# Patient Record
Sex: FEMALE
[De-identification: names, ages, dates, MRNs, and addresses within clinical notes are randomized per-mention and may not be internally consistent; named-entity substitution may affect disease eponyms.]

---

## 2021-01-04 ENCOUNTER — HOSPITAL ENCOUNTER (EMERGENCY)
Dept: HOSPITAL 97 - ER | Age: 26
Discharge: HOME | End: 2021-01-04
Payer: COMMERCIAL

## 2021-01-04 VITALS — TEMPERATURE: 99.2 F | DIASTOLIC BLOOD PRESSURE: 91 MMHG | SYSTOLIC BLOOD PRESSURE: 117 MMHG | OXYGEN SATURATION: 99 %
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
## 2021-01-04 DIAGNOSIS — S00.12XA: Primary | ICD-10-CM
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
## 2021-01-04 PROCEDURE — 76377 3D RENDER W/INTRP POSTPROCES: CPT

## 2021-01-04 PROCEDURE — 70486 CT MAXILLOFACIAL W/O DYE: CPT

## 2021-01-04 PROCEDURE — 99283 EMERGENCY DEPT VISIT LOW MDM: CPT
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
## 2021-01-04 NOTE — XMS REPORT
Summary of Care

                           Created on:2020



Patient:Nickie Keenan

Sex:Female

:1995

External Reference #:[id number]





Demographics







                          Address                   [address] [place] [address]

 

                          Mobile Phone              [phone]

 

                          Home Phone                [phone]

 

                          Email Address             [email or web address]

 

                          Preferred Language        English

 

                          Marital Status            Single

 

                          Restorationism Affiliation     Unknown

 

                          Race                      Black or 

 

                          Ethnic Group              Not  or 









Author







                          Organization              [facility]

 

                          Address                   [address] [place] [id number]









Support







                Name            Relationship    Address         Phone

 

                Anisa Espana      Unavailable     Unavailable     [phone]









Care Team Providers







                    Name                Role                Phone

 

                    Suzette Martines MD Primary Care Provider [phone]









Reason for Visit







                          Reason                    Comments

 

                          INJECTION                 depo







Encounter Details







             Date         Type         Department   Care Team    Description

 

                2020      Nurse Visit     [facility] Digna Saldana MD



[address] [place] [address] [address]



[place] [address] [phone] (Fax)                      Encounter for



                                                            Healthcare- [facility]



                                        



Nurse, [facility]               Depo-Provera



                                       [address] [place], [place] [address]t

ion (Primary



                                                            Drive, [address]



                                                    Dx)



                                       [place], [place]              



                                                            [address] [phone]              







Allergies

No Known Allergiesdocumented as of this encounter (statuses as of 2020)



Medications







          Medication Sig       Dispensed Refills   Start     End       Status



                                                  Date      Date      

 

          medroxyPROGESTERone 150 medroxyprogesterone           0               

              Active



          mg/mL injection 150 mg/mL                                         



                    intramuscular                                         



                    suspension                                         

 

          PARoxetine 10 mg Take 1 tablet by mouth 30 tablet 2         20  

          Active



          tabletIndications: daily.                        20                  



          Anxiety and depression                                                

   









          Hospital, Clinic, or Other Ordered Dose Route     Frequency Start Date

 End Date  

Status



          Facility Administered                                                 

  



          Medication                                                   

 

          medroxyPROGESTERone 150 mg    IM        ONCE      2020

0 Ended



          (DEPO-PROVERA) injection                                              

     



          150 mg                                                      



documented as of this encounter (statuses as of 2020)



Active Problems







                          Problem                   Noted Date

 

                          B12 deficiency            2019









                                        Overview: 







                                        B12 level <400, prescribed an oral suppl

ement









                          Tobacco use               10/11/2019

 

                          Anxiety and depression    10/11/2019

 

                          Skin hypopigmentation     10/11/2019

 

                          Abnormal Pap smear of cervix 10/01/2019

 

                          Seasonal allergies        10/01/2019



documented as of this encounter (statuses as of 2020)



Immunizations







                    Name                Administration Dates Next Due

 

                    TDAP                2016          



documented as of this encounter



Social History







             Tobacco Use  Types        Packs/Day    Years Used   Date

 

             Current Some Day Smoker Cigarettes                             









                Smokeless Tobacco: Never Used                                 









                                        Comments: 1 Pack per 2 weeks









                Alcohol Use     Drinks/Week     oz/Week         Comments

 

                Yes                                             ocassionally- on

ce a week









                          Sex Assigned at Birth     Date Recorded

 

                          Not on file               









                    COVID-19 Exposure   Response            Date Recorded

 

                    In the last month, have you been in contact with No / Unsure

         2020 10:01 AM 

CST



                    someone who was confirmed or suspected to have              

       



                    Coronavirus / COVID-19?                     



documented as of this encounter



Last Filed Vital Signs







                Vital Sign      Reading         Time Taken      Comments

 

                Blood Pressure  107/72          2020 10:22 AM CST 

 

                Pulse           80              2020 10:22 AM CST 

 

                Temperature     36.8 C (98.3 F) 2020 10:22 AM CST 

 

                Respiratory Rate 18              2020 10:22 AM CST 

 

                Oxygen Saturation -               -               

 

                Inhaled Oxygen Concentration -               -               

 

                Weight          61.2 kg (135 lb) 2020 10:22 AM CST 

 

                Height          157.5 cm (5' 2") 2020 10:22 AM CST 

 

                Body Mass Index 24.69           2020 10:22 AM CST 



documented in this encounter



Patient Instructions

Patient InstructionsSavannah Sultana RN - 2020 10:00 AM CST

Patient Education



Medroxyprogesterone injection [Contraceptive]

Brand Names: Depo-Provera, Depo-subQ Provera 104

What is this medicine?

MEDROXYPROGESTERONE (me DROX ee proe JUSTIN te megan) contraceptive injections 
prevent pregnancy. They provide effective birth control for 3 months. Depo-subQ 
Provera 104 is also used for treating pain related to endometriosis.

How should I use this medicine?

Depo-Provera Contraceptive injection is given into a muscle. Depo-subQ Provera 
104 injection is given under the skin. These injections are given by a health 
care professional. You must not be pregnant before getting an injection. The 
injection is usually given during the first 5 days after the start of a 
menstrual period or 6 weeks after delivery of a baby.

Talk to your pediatrician regarding the use of this medicine in children. 
Special care may be needed. These injections have been used in female children 
who have started having menstrual periods.

What side effects may I notice from receiving this medicine?

Side effects that you should report to your doctor or health care professional 
as soon as possible:

 allergic reactions like skin rash, itching or hives, swelling of the face, 
lips, or tongue

 breast tenderness or discharge

 breathing problems

 changes in vision

 depression

 feeling faint or lightheaded, falls

 fever

 pain in the abdomen, chest, groin, or leg

 problems with balance, talking, walking

 unusually weak or tired

 yellowing of the eyes or skin

Side effects that usually do not require medical attention (report to your 
doctor or health care professional if they continue or are bothersome):

 acne

 fluid retention and swelling

 headache

 irregular periods, spotting, or absent periods

 temporary pain, itching, or skin reaction at site where injected

 weight gain

What may interact with this medicine?

Do not take this medicine with any of the following medications:

 bosentan

This medicine may also interact with the following medications:

 aminoglutethimide

 antibiotics or medicines for infections, especially rifampin, rifabutin, 
rifapentine, and griseofulvin

 aprepitant

 barbiturate medicines such as phenobarbital or primidone

 bexarotene

 carbamazepine

 medicines for seizures like ethotoin, felbamate, oxcarbazepine, phenytoin, 
topiramate

 modafinil

 White [place]'s wort

What if I miss a dose?

Try not to miss a dose. You must get an injection once every 3 months to 
maintain birth control. If you cannot keep an appointment, call and reschedule 
it. If you wait longer than 13 weeks between Depo-Provera contraceptive 
injections or longer than 14 weeks between Depo-subQ Provera 104 injections, you
 could get pregnant. Use another method for birth control if you miss your 
appointment. You may also need a pregnancy test before receiving another 
injection.

Where should I keep my medicine?

This does not apply. The injection will be given to you by a health care 
professional.

What should I tell my health care provider before I take this medicine?

They need to know if you have any of these conditions:

 frequently drink alcohol

 asthma

 blood vessel disease or a history of a blood clot in the lungs or legs

 bone disease such as osteoporosis

 breast cancer

 diabetes

 eating disorder (anorexia nervosa or bulimia)

 high blood pressure

 HIV infection or AIDS

 kidney disease

 liver disease

 mental depression

 migraine

 seizures (convulsions)

 stroke

 tobacco smoker

 vaginal bleeding

 an unusual or allergic reaction to medroxyprogesterone, other hormones, 
medicines, foods, dyes, or preservatives

 pregnant or trying to get pregnant

 breast-feeding

What should I watch for while using this medicine?

This drug does not protect you against HIV infection (AIDS) or other sexually 
transmitted diseases.

Use of this product may cause you to lose calcium from your bones. Loss of 
calcium may cause weak bones (osteoporosis). Only use this product for more than
 2 years if other forms of birth control are not right for you. The longer you 
use this product for birth control the more likely you will be at risk for weak 
bones. Ask your health care professional how you can keep strong bones.

You may have a change in bleeding pattern or irregular periods. Many females 
stop having periods while taking this drug.

If you have received your injections on time, your chance of being pregnant is 
very low. If you think you may be pregnant, see your health care professional as
 soon as possible.

Tell your health care professional if you want to get pregnant within the next 
year. The effect of this medicine may last a long time after you get your last 
injection.

NOTE:This sheet is a summary. It may not cover all possible information. If you 
have questions aboutthis medicine, talk to your doctor, pharmacist, or health 
care provider. Copyright 2018 [employer]





Electronically signed by Savannah Sultana RN at 2020 10:14 AM CST

documented in this encounter



Progress Notes

Savannah Sultana RN - 2020 10:00 AM CST25 year old female has been 
identified by  and name.  Verbal consent has been obtained by patientto have 
an injection of Depo Provera, as ordered by the provider.



Date of last Depo Provera injection: 2020



Last Pap Smear: 2019



Encounter Diagnosis:  v25.49



The site was cleaned with an alcohol swab and given intramuscularly (IM) in the 
left gluteus.  A band aid dressing was then applied to the injection site.  The 
patient tolerated the procedure well .

Electronically signed by Savannah Sultana RN at 2020 10:30 AM CST
documented in this encounter



Plan of Treatment







             Date         Type         Specialty    Care Team    Description

 

                2020      Office Visit    Obstetrics & Gynecology Rachael Arana PA-C



                                        



                                                                [address] [place] [address]

 [phone]



                                        



                                                    [phone] (Fax) 

 

             2021   Nurse Visit  Obstetrics & Gynecology Nurse, [facility] 









                Health Maintenance Due Date        Last Done       Comments

 

                PNEUMOCOCCAL 0-64 YEARS COMBINED SERIES (1 of 1 - 1999    

                  



                PPSV23)                                         

 

                Depression Screening 2005                      

 

                INFLUENZA VACCINE (#1) 2020                      

 

                PAP SMEAR       2022      

 

                DTaP,Tdap,and Td Vaccines (2 - Td) 2026   

   



documented as of this encounter



Results

Not on filedocumented in this encounter



Visit Diagnoses







                                        Diagnosis

 

                                        Encounter for Depo-Provera contraception

 - Primary







                                        Surveillance of other previously prescri

bed contraceptive method



documented in this encounter



Administered Medications







           Medication Order MAR Action Action Date Dose       Rate       Site

 

           medroxyPROGESTERone Given      2020 10:28 150 mg               

 Left



           (DEPO-PROVERA) injection 150            AM CST                       

    Dorsogluteal-IM



                                        mg



                                                                 



           150 mg, Intramuscular, ONCE, 1                                       

      



           dose, Mon 20 at 1130,                                           

  



           Routine                                                









                                                    



documented in this encounter



Insurance







          Payer     Benefit Plan / Subscriber ID Effective Phone     Address   T

[facility] [id number] 2018-Prese           [address]

aid



           HEALTH CHOICE - HEALTH CHOICE            [id number]                    [id number]

1



                                        



          MANAGED   MEDICAID                                [place], [place] 



          MEDICAID                                          [id number] 









           Guarantor Name Account Type Relation to Date of    Phone      Billing



                                 Patient    Birth                 Address

 

           Nickie Keenan Personal/Family Self       1995 [phone] [address]



                                                       (Home)     [place], [place]



                                                                  [id number]



documented as of this encounter

## 2021-01-04 NOTE — XMS REPORT
Continuity of Care Document

                           Created on:2021



Patient:JESSE NOWAK

Sex:Female

:1995

External Reference #:[id number]





Demographics







                          Address                   [address] [place] [address]

 

                          Home Phone                [phone]

 

                          Mobile Phone              [phone]

 

                          Email Address             [address]

 

                          Preferred Language        English

 

                          Marital Status            Unknown

 

                          Muslim Affiliation     000

 

                          Race                      Unknown

 

                          Additional Race(s)        Unavailable



                                                    Black or 

 

                          Ethnic Group              Not  or 









Author







                          Organization              [facility]

t

 

                          Address                   [address]



                                                    [place] [address]

 

                          Phone                     [phone]









Support







                Name            Relationship    Address         Phone

 

                Malorie            Mother          Unavailable     [phone]









Care Team Providers







                    Name                Role                Phone

 

                    Yasmeen BURLESON       Attending Clinician [phone]









Problems

This patient has no known problems.



Allergies, Adverse Reactions, Alerts

This patient has no known allergies or adverse reactions.



Medications

This patient has no known medications.



Procedures

This patient has no known procedures.



Encounters







        Start   End     Encounter Admission Attending Care    Care    Encounter 

Source



        Date/Time Date/Time Type    Type    Clinicians Facility Department ID   

   

 

        2020 Case            [facility]    [phone] [id number]

[id number] 



        00:00:00 00:00:00 Management         Rachael Abdi [id number]       

  



                                                [place] [id number]         



                                                [address] [phone]         



                                                [address]     [address]             



                                                Building                 

 

        2020 Telephone         [facility]    [phone] [id number]

[id number] 



        00:00:00 00:00:00                 Rachael Abdi [id number]         



                                                [place] [id number]         



                                                [place] [phone]         



                                                [facility]     [address]             



                                                [facility]                 







Results

This patient has no known results.

## 2021-01-04 NOTE — RAD REPORT
EXAM DESCRIPTION:  CT - Facial Bones W/ Mpr - 1/4/2021 12:53 pm

 

CLINICAL HISTORY:  Facial injury status post trauma. Facial pain

 

TECHNIQUE:  Computed axial tomography of the face was obtained. Coronal and sagittal reconstruction w
as performed.

 

All CT scans are performed using dose optimization technique as appropriate and may include automated
 exposure control or mA/KV adjustment according to patient size.

 

FINDINGS:  Left preseptal swelling.

 

 A fracture is not seen. Deviation of nasal septum towards the left

 

A TMJ dislocation is not noted.

 

The globes are intact. Fluid within the sinuses is not seen. Mucus retention cyst right maxillary sin
us

 

IMPRESSION:   Negative for a facial fracture.

## 2021-01-04 NOTE — XMS REPORT
Summary of Care

                          Created on:2020



Patient:Nickie Keenan

Sex:Female

:1995

External Reference #:[id number]





Demographics







                          Address                   [address] [place] [address]

 

                          Mobile Phone              [phone]

 

                          Home Phone                [phone]

 

                          Email Address             [email or web address]

 

                          Preferred Language        English

 

                          Marital Status            Single

 

                          Zoroastrian Affiliation     Unknown

 

                          Race                      Black or 

 

                          Ethnic Group              Not  or 









Author







                          Organization              [facility]

 

                          Address                   [address] [place] [id number]









Support







                Name            Relationship    Address         Phone

 

                Anisa Espana      Unavailable     Unavailable     [phone]









Care Team Providers







                    Name                Role                Phone

 

                    Suzette Martines MD Primary Care Provider [phone]









Reason for Visit







                          Reason                    Comments

 

                          Well Woman Exam           







Encounter Details







             Date         Type         Department   Care Team    Description

 

             2020   Office Visit [facility] Rachael Arana, Well

 woman exam 

with routine gynecological exam (Primary Dx);



                                                            [facility]



                                        Screening breast examination;



                                       [address] [place] [address] Depo-Pr

overa contraceptive status;



                                                            [address], [address]



                                        Drive



                                        Need for HPV vaccine;



                                                [place], [place]    [address]



                                        Papanicolaou smear of cervix with low gr

primo squamous intraepithelial lesion 

(LGSIL)



                                                            [id number]



                          [place]              



                                                [phone] [id number] [phone] 



                                                    (Fax)        







Allergies

No Known Allergiesdocumented as of this encounter (statuses as of 2020)



Medications







          Medication Sig       Dispensed Refills   Start     End       Status



                                                  Date      Date      

 

          medroxyPROGESTERone 150 medroxyprogesterone           0               

              Active



          mg/mL injection 150 mg/mL                                         



                    intramuscular                                         



                    suspension                                         

 

          PARoxetine 10 mg Take 1 tablet by mouth 30 tablet 2         20  

          Active



          tabletIndications: daily.                        20                  



          Anxiety and depression                                                

   



documented as of this encounter (statuses as of 2020)



Active Problems







                          Problem                   Noted Date

 

                          B12 deficiency            2019









                                        Overview: 







                                        B12 level <400, prescribed an oral suppl

ement









                          Tobacco use               10/11/2019

 

                          Anxiety and depression    10/11/2019

 

                          Skin hypopigmentation     10/11/2019

 

                          Abnormal Pap smear of cervix 10/01/2019

 

                          Seasonal allergies        10/01/2019



documented as of this encounter (statuses as of 2020)



Immunizations







                    Name                Administration Dates Next Due

 

                    HPV9                2020

 

                    TDAP                2016          



documented as of this encounter



Social History







             Tobacco Use  Types        Packs/Day    Years Used   Date

 

             Current Some Day Smoker Cigarettes                             









                Smokeless Tobacco: Never Used                                 









                                        Tobacco Cessation: Ready to Quit: No; Co

unseling Given: Yes



                                        Comments: 1 Pack per 2 weeks









                Alcohol Use     Drinks/Week     oz/Week         Comments

 

                Yes                                             ocassionally- on

ce a week









                          Sex Assigned at Birth     Date Recorded

 

                          Not on file               









                    COVID-19 Exposure   Response            Date Recorded

 

                    In the last month, have you been in contact with No / Unsure

         2020  7:56 AM

CST



                    someone who was confirmed or suspected to have              

       



                    Coronavirus / COVID-19?                     



documented as of this encounter



Last Filed Vital Signs







                Vital Sign      Reading         Time Taken      Comments

 

                Blood Pressure  112/78          2020  8:10 AM 



                                                CST             

 

                Pulse           78              2020  8:10 AM 



                                                CST             

 

                Temperature     36.8 C (98.2 F) 2020  8:10 AM 



                                                CST             

 

                Respiratory Rate 18              2020  8:10 AM 



                                                CST             

 

                Oxygen Saturation -               -               

 

                Inhaled Oxygen Concentration -               -               

 

                Weight          62.1 kg (136 lb 12.8 oz) 2020  8:10 AM 



                                                CST             

 

                Height          157.5 cm (5' 2") 2020  8:10 AM 



                                                CST             

 

                Body Mass Index 25.02           2020  8:10 AM 



                                                CST             



documented in this encounter



Progress Notes

Savannah Sultana RN - 2020  8:00 AM CST25 year old female has been 
identified by  and name.  Verbal consent has been obtained by patientto have 
an injection, as ordered by the provider.



NDC (National Drug Code) 7552-3314-63.

Patient or state-supplied medications?: no



Has ABN (Advanced Beneficiary Notice) been completed?  No



Previous/Current Encounter Diagnosis:  Need for HPV vaccine



The site was cleaned with an alcohol swab and given intramuscularly (IM).  A 
band aid dressing was then applied to the injection site.  The patient tolerated
the procedure well and was observed for 20 minutes after the injection for 
possible reaction.



Patient provided with preferred teaching of verbal information on Anaphylaxis. 
Shows readiness to learn. Verbal/Written instruction teaching provided. 
Individual is able to read and verbalizes understanding of teaching provided. 
Signs and Symptoms of Anaphylaxis (severe allergic reaction) are: Tingling, 
itching or metallic taste in mouth; hives; difficulty breathing; swelling and/or
itching of mouth and/or throat; diarrhea, vomiting, cramps and stomach pain; 
paleness; loss of consciousness. IF YOU HAVE ANY OF THE SYMPTOMS ABOVE, ACT 
FAST!!! CALL 911 IMMEDIATELY IF YOU HAVE A PRESCRIBED EPI-PEN PLEASE USE IT NOW.

Electronically signed by Savannah Sultana RN at 2020  9:02 AM Rachael Castillo PA-C - 2020  8:00 AM CSTFormatting of this note might be 
different from the original.

Chief complaint:

Chief Complaint

Patient presents with

 Well Woman Exam



HPI

Nickie Keenan is a 25 year old female  presenting for well woman exam.

She is particularly concerned about WWE

The patient has a Body mass index is 25.02 kg/m..  She is working on eating 
healthier and exercising more.



The patient is  not concerned about her menstrual cycles.  Her cycles are not 
present due to depo.

The patient is sexually active.  She currently has 1 sexual partner(s).  She is 
offered sexually transmitted disease testing and declines.

She has had 1 sexual partners in the past year.  She engages in vaginal and oral
sex.  She prefers men.

She is currently using depo for contraception.

The patient denies any urinary incontinence.  She denies any fecal incontinence.



Her last pap smear was in 2019 and was abnormal. LGSIL. Patient reports has had 
a hx of abnormal papsmears before. Patient reports she has seen Dr. Ferrell and 
had a colpo. Will request medical records.  Her next pap smear is due today..

She engages in breast self awareness.  She denies any breast changes.



She denies any family history of breast, ovarian, uterine cancer.

Patient reports has paternal grandfather that had colon cancer.



She has not had a flu shot this year.  Patient declines.

The patient feels safe at home.



She denies any history of drug use.  She does smoke.  She  drinks socially.



Her mood is good.



Histories

OB History

 Para Term  AB Living

1 1 1     1

SAB TAB Ectopic Multiple Live Births

        1



# Outcome Date GA Lbr Rj/2nd Weight Sex Delivery Anes PTL Lv

1 Term 16   6 lb (2.722 kg)  Vag-Spont   JOSE ARMANDO



Past Medical History:

Diagnosis Date

 Abnormal Pap smear of cervix 10/1/2019

 Anxiety and depression 10/11/2019

 B12 deficiency 2019

 B12 level &lt;400, prescribed an oral supplement

 Seasonal allergies 10/1/2019

 Skin hypopigmentation 10/11/2019



Family History

Problem Relation Age of Onset

 Anxiety Mother

 Hypertension Father

 Diabetes Father

 Depression Father

 Anxiety Father

 Asthma Father

 CHF (congestive heart failure) Father

 Pancreatic Cancer Maternal Grandmother

 Cancer Maternal Grandfather

     throat cancer

 Colon Cancer Paternal Grandfather



Family Status

Relation Name Status

 Mo  Alive

 Fa  Alive

 MGMo  (Not Specified)

 MGFa  (Not Specified)

 PGFa  (Not Specified)



Past Surgical History:

Procedure Laterality Date

 COLPOSCOPY



Social History



Socioeconomic History

 Marital status: Single

  Spouse name: Not on file

 Number of children: Not on file

 Years of education: Not on file

 Highest education level: Not on file

Occupational History

 Not on file

Social Needs

 Financial resource strain: Not on file

 Food insecurity

  Worry: Not on file

  Inability: Not on file

 Transportation needs

  Medical: Not on file

  Non-medical: Not on file

Tobacco Use

 Smoking status: Current Some Day Smoker

  Types: Cigarettes

 Smokeless tobacco: Never Used

 Tobacco comment: 1 Pack per 2 weeks

Substance and Sexual Activity

 Alcohol use: Yes

  Comment: ocassionally- once a week

 Drug use: Yes

  Types: Marijuana

 Sexual activity: Yes

  Partners: Male

  Birth control/protection: Injection

Lifestyle

 Physical activity

  Days per week: Not on file

  Minutes per session: Not on file

 Stress: Not on file

Relationships

 Social connections

  Talks on phone: Not on file

  Gets together: Not on file

  Attends Oriental orthodox service: Not on file

  Active member of club or organization: Not on file

  Attends meetings of clubs or organizations: Not on file

  Relationship status: Not on file

 Intimate partner violence

  Fear of current or ex partner: Not on file

  Emotionally abused: Not on file

  Physically abused: Not on file

  Forced sexual activity: Not on file

Other Topics Concern

 Not on file

Social History Narrative

 Denies physical and sexual abuse.



Social History



Substance and Sexual Activity

Sexual Activity Yes

 Partners: Male

 Birth control/protection: Injection







Labs

none



Radiology

none



Allergies

Nickie has No Known Allergies.



Medications

Nickie has a current medication list which includes the following 
prescription(s): paroxetine and medroxyprogesterone.



Review of Systems

Constitutional: Negative for appetite change, fatigue, fever, unexpected weight 
change, weight gain and weight loss.

HENT: Negative for rhinorrhea and sore throat.

Eyes: Negative for pain and itching.

Respiratory: Negative for cough, chest tightness and shortness of breath.

Breasts: Negative for discharge, mass and pain.

Cardiovascular: Negative for chest pain, palpitations and leg swelling.

Gastrointestinal: Negative for abdominal pain, constipation, diarrhea and 
nausea.

Genitourinary: Negative for bladder incontinence, dysuria, vaginal discharge, 
difficulty urinating, vaginal pain and pelvic pain.

Musculoskeletal: Negative for gait problem and myalgias.

Skin: Negative for rash.

Neurological: Negative for dizziness and headaches.

Psychiatric/Behavioral: Negative for suicidal ideas. The patient is not 
nervous/anxious.

Endocrine: Negative for hair loss, weight gain and weight loss.



/78 (BP Location: Left arm, Patient Position: Sitting, BP CUFF SIZE: Adult
Medium)  | Pulse 78 | Temp 36.8 C (98.2 F) (Oral)  | Resp 18  | Ht 5' 2" 
(1.575 m)  | Wt 136 lb 12.8 oz (62.1 kg)  | LMP  (LMP Unknown)  | BMI 25.02 
kg/m

Pregravid BMI: Could not be calculated



Physical Exam

Vitals reviewed.

Constitutional: She is oriented to person, place, and time. She appears well-
developed and well-nourished.

Neck: No mass. No thyromegaly palpated. No neck adenopathy.

Cardiovascular: Regular rate and rhythm.

Pulmonary/Chest: Normal inspiratory effort.

Abdominal: Abdomen is soft. No tenderness present. No hernia palpated or 
inspected.

Neuro/Psychiatric: She has a normal mood and affect. She is oriented to person, 
place, and time.

Skin: Skin normal.

Lymphadenopathy: No neck adenopathy present. No axillary adenopathy present. No 
inguinal adenopathy present.



Breast: Right breast exhibits no mass, no nipple discharge and no tenderness. 
Left breast exhibits no mass, no nipple discharge and no tenderness. Breasts are
symmetrical. Normal left breast and normalright breast

External genitalia: Normal external genitalia appropriate for age.

Urethral meatus: Normal urethral meatus

Urethra: Normal urethra.

Bladder: No tenderness.  Normal bladder

Vagina:Normal vagina. No lesion inspected. No abnormal vaginal discharge found. 
  No lesions in thevagina.



Cervix: Normal cervix. No lesion. No tenderness and no discharge present.

Uterus: Uterus is non-tender. Normal uterus

Adnexa: Right adnexa without tenderness. Left adnexa without tenderness. Normal 
left adnexa and normal right adnexa

Anus/perineum: Normal perineum and normal anus.







Assessment/Plan

Well woman exam with routine gynecological exam  (primary encounter diagnosis)

FOLLOW-UP in 1 yr WWE



HPV vaccine

-series began today. Patient education given.



On depo

For Depo-provera, it is given every 3 months.  May cause heavy and irregular 
bleeding initially.  Side effects include but not limited to headache, weight 
gain, mood lability, breast tenderness, and abnormal uterine bleeding.  
Discussed need for calcium and weightbearing exercise and stressed importance of
continued use of condoms for STD protection. It may take up to a year after 
discontinuation of medication to regain ovulation. Discussed risks and benefits 
of medication and questions answered.



Screening breast examination

No complaints, self awareness.



Return to clinic in 1 yr WWE

Discussed treatment options.

Reviewed patient instructions and provided printed copy.



This visit did not involve counseling and coordination that comprised more than 
50% of the visit time.



Rachael Arana PA-C  2020  8:13 AM



Electronically signed by Rachael Arana PA-C at 2020  9:18 AM CST
documented in this encounter



Plan of Treatment







             Date         Type         Specialty    Care Team    Description

 

             2021   Nurse Visit  Obstetrics & Gynecology Nurse, [facility]

s [facility] 

 

             2021   Nurse Visit  Obstetrics & Gynecology Nurse, [facility] 

 

                2021      Office Visit    Obstetrics & Gynecology Rachael Arana PA-C



                                        



                                                                [address] [place] [address]

 [phone] (Fax) 









                Health Maintenance Due Date        Last Done       Comments

 

                PNEUMOCOCCAL 0-64 YEARS COMBINED SERIES ( of  - 1999    

                  



                PPSV23)                                         

 

                Depression Screening 2005                      

 

                INFLUENZA VACCINE (#1) 2020                      

 

                PAP SMEAR       2022      

 

                DTaP,Tdap,and Td Vaccines (2 - Td) 2026   

   



documented as of this encounter



Procedures







             Procedure Name Priority     Date/Time    Associated Diagnosis Comme

nts

 

             GARDASIL 9 (HPV 9V) Routine      2020  8:47 AM CST Need for H

PV vaccine 



             VACCINE                                             



documented in this encounter



Results

Not on filedocumented in this encounter



Visit Diagnoses







                                        Diagnosis

 

                                        Well woman exam with routine gynecologic

al exam - Primary







                                        Routine gynecological examination

 

                                        Screening breast examination







                                        Other screening breast examination

 

                                        Depo-Provera contraceptive status







                                        Surveillance of other previously prescri

bed contraceptive method

 

                                        Need for HPV vaccine







                                        Need for prophylactic vaccination and in

oculation against other viral diseases

 

                                        Papanicolaou smear of cervix with low gr

primo squamous intraepithelial lesion 

(LGSIL)



documented in this encounter



Insurance







          Payer     Benefit Plan / Subscriber ID Effective Phone     Address   T

ype



                    Group               Dates                         

 

          [facility] [id number] 2018-Jamia           [address]

aid



           [place] - [place]                    [id number]

1



                                        



          MANAGED   MEDICAID                                [place], [place]                                          [id number] 









           Guarantor Name Account Type Relation to Date of    Phone      Billing



                                 Patient    Birth                 Address

 

           Nickie Keenan Personal/Family Self       1995 [phone]



                                                       (Home)     [place], [place]



                                                                  [id number]



documented as of this encounter

## 2021-01-04 NOTE — XMS REPORT
Summary of Care

                          Created on:2020



Patient:Nickie Keenan

Sex:Female

:1995

External Reference #:[id number]





Demographics







                          Address                   [address] [place] [address]

 

                          Mobile Phone              [phone]

 

                          Home Phone                [phone]

 

                          Email Address             [email or web address]

 

                          Preferred Language        English

 

                          Marital Status            Single

 

                          Catholic Affiliation     Unknown

 

                          Race                      Black or 

 

                          Ethnic Group              Not  or 









Author







                          Organization              [facility]

 

                          Address                   [address] [place] [id number]









Support







                Name            Relationship    Address         Phone

 

                Anisa Espana      Unavailable     Unavailable     [phone]









Care Team Providers







                    Name                Role                Phone

 

                    Suzette Martines MD Primary Care Provider [phone]









Reason for Visit







                          Reason                    Comments

 

                          Medical Records           







Encounter Details







             Date         Type         Department   Care Team    Description

 

             2020   Case Management [facility] Rachael Arana M

[facility]



                                                            [facility]- [address], [address]



                                        [place] [address]



                                        



                                                            [address]



                          [place]              



                                                [phone]    [address]



                                        



                                                                [phone] (Fax) 







Allergies

No Known Allergiesdocumented as of this encounter (statuses as of 2020)



Medications







          Medication Sig       Dispensed Refills   Start     End       Status



                                                  Date      Date      

 

          medroxyPROGESTERone 150 medroxyprogesterone           0               

              Active



          mg/mL injection 150 mg/mL                                         



                    intramuscular                                         



                    suspension                                         

 

          PARoxetine 10 mg Take 1 tablet by mouth 30 tablet 2         20  

          Active



          tabletIndications: daily.                        20                  



          Anxiety and depression                                                

   



documented as of this encounter (statuses as of 2020)



Active Problems







                          Problem                   Noted Date

 

                          B12 deficiency            2019









                                        Overview: 







                                        B12 level <400, prescribed an oral suppl

ement









                          Tobacco use               10/11/2019

 

                          Anxiety and depression    10/11/2019

 

                          Skin hypopigmentation     10/11/2019

 

                          Abnormal Pap smear of cervix 10/01/2019

 

                          Seasonal allergies        10/01/2019



documented as of this encounter (statuses as of 2020)



Immunizations







                    Name                Administration Dates Next Due

 

                    HPV9                2020

 

                    TDAP                2016          



documented as of this encounter



Social History







             Tobacco Use  Types        Packs/Day    Years Used   Date

 

             Current Some Day Smoker Cigarettes                             









                Smokeless Tobacco: Never Used                                 









                                        Comments: 1 Pack per 2 weeks









                Alcohol Use     Drinks/Week     oz/Week         Comments

 

                Yes                                             ocassionally- on

ce a week









                          Sex Assigned at Birth     Date Recorded

 

                          Not on file               



documented as of this encounter



Last Filed Vital Signs

Not on filedocumented in this encounter



Progress Notes

Rachael Arana PA-C - 2020  4:10 PM CSTMR received from [facility]



PAP done on 2019

LGSIL



PAP done on 2018

ASCUS

HR HPV + non 16/18



Endocervical Biopsy done on 2018

Dx: scant squamous metaplastic cells with atypia. Fragment with atypical 
squamous metaplastic cells is absent in the deeper levels used for 
immunohistochemical staining for p16 and k167 antibodies wereprocessed as a 
double stain.  The endocervical biopsy contains a fragment of squamous 
metaplastic cells with irregular , hyperchromatic nuclei. These are suspicious 
for, but not diagnostic of dysplasia.



Pap done on 2017

NILM



GC/CT done on 3/22/18 NEG



Pap done on 3/21/2018

ASCUS

HR HPV done on 3/21/2018 + non 16/18



GC/CT done on 3/29/17 NEG



Surgical path report on 2016

Vaginal delivery, fever, chorioamnionitis

-placenta, increased microcalcification, focal, increased cord twisting, mild.



1 hr gtt done on 2016 WNL

HIV done on 2016- NEG



CBC done on 2016- wnl



Sequential Screen Part 1- wnl



HCV done on 2015- neg



Pap smear done on 2015

NILMElectronically signed by Rachael Arana PA-C at 2020  4:21 PM CST
documented in this encounter



Plan of Treatment







             Date         Type         Specialty    Care Team    Description

 

                2021      Office Visit    Obstetrics & Gynecology Kyle Saldana MD



                                        



                                                                [address] [place] [address] [address]. [address]



                                        



                                                                [place] [phone]

15



                                        



                                                                [phone] (Fax) 

 

             2021   Nurse Visit  Obstetrics & Gynecology Nurse, [facility]

s [facility] 

 

             2021   Nurse Visit  Obstetrics & Gynecology Nurse, [facility]

s [facility] 

 

                2021      Office Visit    Obstetrics & Gynecology Rachael Arana PA-C



                                        



                                                                [address] [place] [address]

 [phone] (Fax) 









                Health Maintenance Due Date        Last Done       Comments

 

                PNEUMOCOCCAL 0-64 YEARS COMBINED SERIES (1 of  - 1999    

                  



                PPSV23)                                         

 

                Depression Screening 2005                      

 

                INFLUENZA VACCINE (#1) 2020                      

 

                PAP SMEAR       2022      

 

                DTaP,Tdap,and Td Vaccines (2 - Td) 2026   

   



documented as of this encounter



Results

Not on filedocumented in this encounter



Insurance







          Payer     Benefit Plan / Subscriber ID Effective Phone     Address   [facility] [id number] 2018-Jamia           [address]  [employer]

aid



           HEALTH CHOICE - HEALTH CHOICE            nt                    [id number]

1



                                        



          [facility]   MEDICAID                                [place], [place] MEDICA[place]                                          [id number] 



documented as of this encounter
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
## 2021-01-04 NOTE — XMS REPORT
Summary of Care

                          Created on:2020



Patient:Nickie Keenan

Sex:Female

:1995

External Reference #:[id number]





Demographics







                          Address                   [address] [place] [address]

 

                          Mobile Phone              [phone]

 

                          Home Phone                [phone]

 

                          Email Address             [email or web address]

 

                          Preferred Language        English

 

                          Marital Status            Single

 

                          Yazdanism Affiliation     Unknown

 

                          Race                      Black or 

 

                          Ethnic Group              Not  or 









Author







                          Organization              [facility]

 

                          Address                   [address] [place] [address]









Support







                Name            Relationship    Address         Phone

 

                Anisa Espana      Unavailable     Unavailable     [phone]









Care Team Providers







                    Name                Role                Phone

 

                    Suzette Martines MD Primary Care Provider [phone]









Encounter Details







             Date         Type         Department   Care Team    Description

 

                    2020          Orders Only         [facility]



                          Doctor Unassigned, No     



                                                            [address] [facility]



                                        Name



                                        



                                                [place] [address] [address] [place] [address] 







Allergies

No Known Allergiesdocumented as of this encounter (statuses as of 2020)



Medications







          Medication Sig       Dispensed Refills   Start     End       Status



                                                  Date      Date      

 

          medroxyPROGESTERone 150 medroxyprogesterone           0               

              Active



          mg/mL injection 150 mg/mL                                         



                    intramuscular                                         



                    suspension                                         

 

          PARoxetine 10 mg Take 1 tablet by mouth 30 tablet 2         20  

          Active



          tabletIndications: daily.                        20                  



          Anxiety and depression                                                

   



documented as of this encounter (statuses as of 2020)



Active Problems







                          Problem                   Noted Date

 

                          B12 deficiency            2019









                                        Overview: 







                                        B12 level <400, prescribed an oral suppl

ement









                          Tobacco use               10/11/2019

 

                          Anxiety and depression    10/11/2019

 

                          Skin hypopigmentation     10/11/2019

 

                          Abnormal Pap smear of cervix 10/01/2019

 

                          Seasonal allergies        10/01/2019



documented as of this encounter (statuses as of 2020)



Immunizations







                    Name                Administration Dates Next Due

 

                    HPV9                2020

 

                    TDAP                2016          



documented as of this encounter



Social History







             Tobacco Use  Types        Packs/Day    Years Used   Date

 

             Current Some Day Smoker Cigarettes                             









                Smokeless Tobacco: Never Used                                 









                                        Comments: 1 Pack per 2 weeks









                Alcohol Use     Drinks/Week     oz/Week         Comments

 

                Yes                                             ocassionally- on

ce a week









                          Sex Assigned at Birth     Date Recorded

 

                          Not on file               









                    COVID-19 Exposure   Response            Date Recorded

 

                    In the last month, have you been in contact with No / Unsure

         2020  7:56 AM

CST



                    someone who was confirmed or suspected to have              

       



                    Coronavirus / COVID-19?                     



documented as of this encounter



Last Filed Vital Signs

Not on filedocumented in this encounter



Plan of Treatment







             Date         Type         Specialty    Care Team    Description

 

             2021   Nurse Visit  Obstetrics & Gynecology Nurse, [facility]

[facility] 

 

             2021   Nurse Visit  Obstetrics & Gynecology Nurse, [facility]

s [facility] 

 

                2021      Office Visit    Obstetrics & Gynecology Rachael Arana PA-C



                                        



                                                                [address] [place] [address]

 [phone] (Fax) 









                Health Maintenance Due Date        Last Done       Comments

 

                PNEUMOCOCCAL 0-64 YEARS COMBINED SERIES (1 of  - 1999    

                  



                PPSV23)                                         

 

                Depression Screening 2005                      

 

                INFLUENZA VACCINE (#1) 2020                      

 

                PAP SMEAR       2022      

 

                DTaP,Tdap,and Td Vaccines (2 - Td) 2026   

   



documented as of this encounter



Procedures







             Procedure Name Priority     Date/Time    Associated Diagnosis Comme

nts

 

             AUTHORIZATION TO RELEASE Routine      2020 12:01 AM          

    



             PHI TO [facility]               CST                       



documented in this encounter



Results

Not on filedocumented in this encounter



Insurance







          Payer     Benefit Plan / Subscriber ID Effective Phone     Address   T

[facility] [id number] 2018-[employer]           [address]

aid



           HEALTH CHOICE - HEALTH CHOICE            nt                    [id number]

1



                                        



          MANAGED   MEDICAID                                [place], [place] MEDICA[place]                                          [id number] 



documented as of this encounter

## 2021-01-04 NOTE — XMS REPORT
Summary of Care

                          Created on:2020



Patient:Nickie Keenan

Sex:Female

:1995

External Reference #:[id number]





Demographics







                          Address                   [address] [place] [address]

 

                          Mobile Phone              [phone]

 

                          Home Phone                [phone]

 

                          Email Address             [email or web address]

 

                          Preferred Language        English

 

                          Marital Status            Single

 

                          Confucianism Affiliation     Unknown

 

                          Race                      Black or 

 

                          Ethnic Group              Not  or 









Author







                          Organization              [facility]

 

                          Address                   [address] [place] [address]









Support







                Name            Relationship    Address         Phone

 

                Anisa Espana      Unavailable     Unavailable     [phone]









Care Team Providers







                    Name                Role                Phone

 

                    ConradAbemartinez MARTINS MD Primary Care Provider [phone]









Reason for Visit







                          Reason                    Comments

 

                          TEST RESULTS              MDL Pap ASCUS, cannot exclud

e HGSIL; HR HPV Positive #52







Encounter Details







             Date         Type         Department   Care Team    Description

 

             2020   Telephone    [facility] Rachael Arana, TEST

 RESULTS (MDLURDES Pap



                                                            [facility]



                                        ASCUS, cannot exclude



                                       [address] [facility]; 

HR HPV Positive



                                                            Drive, [address]



                                        Drive



                                        [address])



                                                [facility] [address]



                                        



                                                            [address]



                          [place], [place]              



                                                [phone] [address] [phone] (Fax) 







Allergies

No Known Allergiesdocumented as of this encounter (statuses as of 2020)



Medications







          Medication Sig       Dispensed Refills   Start     End       Status



                                                  Date      Date      

 

          medroxyPROGESTERone 150 medroxyprogesterone           0               

              Active



          mg/mL injection 150 mg/mL                                         



                    intramuscular                                         



                    suspension                                         

 

          PARoxetine 10 mg Take 1 tablet by mouth 30 tablet 2         20  

          Active



          tabletIndications: daily.                        20                  



          Anxiety and depression                                                

   



documented as of this encounter (statuses as of 2020)



Active Problems







                          Problem                   Noted Date

 

                          B12 deficiency            2019









                                        Overview: 







                                        B12 level <400, prescribed an oral suppl

ement









                          Tobacco use               10/11/2019

 

                          Anxiety and depression    10/11/2019

 

                          Skin hypopigmentation     10/11/2019

 

                          Abnormal Pap smear of cervix 10/01/2019

 

                          Seasonal allergies        10/01/2019



documented as of this encounter (statuses as of 2020)



Immunizations







                    Name                Administration Dates Next Due

 

                    HPV9                2020

 

                    TDAP                2016          



documented as of this encounter



Social History







             Tobacco Use  Types        Packs/Day    Years Used   Date

 

             Current Some Day Smoker Cigarettes                             









                Smokeless Tobacco: Never Used                                 









                                        Comments: 1 Pack per 2 weeks









                Alcohol Use     Drinks/Week     oz/Week         Comments

 

                Yes                                             ocassionally- on

ce a week









                          Sex Assigned at Birth     Date Recorded

 

                          Not on file               









                    COVID-19 Exposure   Response            Date Recorded

 

                    In the last month, have you been in contact with No / Unsure

         2020  7:56 AM

CST



                    someone who was confirmed or suspected to have              

       



                    Coronavirus / COVID-19?                     



documented as of this encounter



Last Filed Vital Signs

Not on filedocumented in this encounter



Miscellaneous Notes

Telephone Encounter - Rachael Arana PA-C - 2020 10:13 AM CSTPatient 
will need colpo with Dr. Saldana.



Please inform patient.



Thank you,



Rachael rAana PA-C



Electronically signed by Rachael Arana PA-C at 2020 10:14 AM CST
Telephone Encounter - Eliezer Abdul MA - 2020  8:30 AM CSTMDL Pap Results:



Pap: ASCUS , cannot exclude HGSIL



HR HPV: Positive for #52



Forwarded to Rachael for review.

Will place original on desk for signature and review and f/u as indicated.



Will be scanned into EPIC

Eliezer Abdul MA  2020  8:32 AM

Electronically signed by Eliezer Abdul MA at 2020  8:33 AM CSTdocumented 
in this encounter



Plan of Treatment







             Date         Type         Specialty    Care Team    Description

 

             2021   Nurse Visit  Obstetrics & Gynecology Nurse, [facility]

[facility] 

 

             2021   Nurse Visit  Obstetrics & Gynecology Nurse, [facility]

[facility] 

 

                2021      Office Visit    Obstetrics & Gynecology Rachael Arana PA-C



                                        



                                                                [address] [place] [address]  [phone] (Fax) 









                Health Maintenance Due Date        Last Done       Comments

 

                PNEUMOCOCCAL 0-64 YEARS COMBINED SERIES (1 of 1 - 1999    

                  



                PPSV23)                                         

 

                Depression Screening 2005                      

 

                INFLUENZA VACCINE (#1) 2020                      

 

                PAP SMEAR       2022      

 

                DTaP,Tdap,and Td Vaccines (2 - Td) 2026   

   



documented as of this encounter



Results

Not on filedocumented in this encounter



Insurance







          Payer     Benefit Plan / Subscriber ID Effective Phone     Address   T

e



                    Group               [facility] [id number] [address]  [address]

aid



           HEALTH CHOICE - HEALTH CHOICE            nt                    [id number]

1



                                        



          MANAGED MEDICAID [place]                                          [id number] 



documented as of this encounter

## 2021-01-04 NOTE — XMS REPORT
Summary of Care

                          Created on:December 15, 2020



Patient:Nickie Keenan

Sex:Female

:1995

External Reference #:[id number]





Demographics







                          Address                   [address] [place] [address]

 

                          Mobile Phone              [phone]

 

                          Home Phone                [phone]

 

                          Email Address             [email or web address]

 

                          Preferred Language        English

 

                          Marital Status            Single

 

                          Scientologist Affiliation     Unknown

 

                          Race                      Black or 

 

                          Ethnic Group              Not  or 









Author







                          Organization              [facility]

 

                          Address                   [address] [place] [address]









Support







                Name            Relationship    Address         Phone

 

                Anisa Espana      Unavailable     Unavailable     [phone]









Care Team Providers







                    Name                Role                Phone

 

                    ConradAbemartinez MARTINS MD Primary Care Provider [phone]









Reason for Visit







                          Reason                    Comments

 

                          TEST RESULTS              MDL Pap ASCUS, cannot exclud

e HGSIL; HR HPV Positive #52







Encounter Details







             Date         Type         Department   Care Team    Description

 

             2020   Telephone    [facility] Rachael Arana, TEST

 RESULTS (MDLURDES Pap



                                                            [facility]



                                        ASCUS, cannot exclude



                                       [address] [facility] H[facility]; 

HR HPV Positive



                                                            Drive, [address]



                                        Drive



                                        [address])



                                                [facility] [address]



                                        



                                                            [address]



                          [place]              



                                                [phone] [address] [phone] (Fax) 







Allergies

No Known Allergiesdocumented as of this encounter (statuses as of 12/15/2020)



Medications







          Medication Sig       Dispensed Refills   Start     End       Status



                                                  Date      Date      

 

          medroxyPROGESTERone 150 medroxyprogesterone           0               

              Active



          mg/mL injection 150 mg/mL                                         



                    intramuscular                                         



                    suspension                                         

 

          PARoxetine 10 mg Take 1 tablet by mouth 30 tablet 2         20  

          Active



          tabletIndications: daily.                        20                  



          Anxiety and depression                                                

   



documented as of this encounter (statuses as of 12/15/2020)



Active Problems







                          Problem                   Noted Date

 

                          B12 deficiency            2019









                                        Overview: 







                                        B12 level <400, prescribed an oral suppl

ement









                          Tobacco use               10/11/2019

 

                          Anxiety and depression    10/11/2019

 

                          Skin hypopigmentation     10/11/2019

 

                          Abnormal Pap smear of cervix 10/01/2019

 

                          Seasonal allergies        10/01/2019



documented as of this encounter (statuses as of 12/15/2020)



Immunizations







                    Name                Administration Dates Next Due

 

                    HPV9                2020

 

                    TDAP                2016          



documented as of this encounter



Social History







             Tobacco Use  Types        Packs/Day    Years Used   Date

 

             Current Some Day Smoker Cigarettes                             









                Smokeless Tobacco: Never Used                                 









                                        Comments: 1 Pack per 2 weeks









                Alcohol Use     Drinks/Week     oz/Week         Comments

 

                Yes                                             ocassionally- on

ce a week









                          Sex Assigned at Birth     Date Recorded

 

                          Not on file               









                    COVID-19 Exposure   Response            Date Recorded

 

                    In the last month, have you been in contact with No / Unsure

         2020  7:56 AM

CST



                    someone who was confirmed or suspected to have              

       



                    Coronavirus / COVID-19?                     



documented as of this encounter



Last Filed Vital Signs

Not on filedocumented in this encounter



Miscellaneous Notes

Telephone Encounter - Eliezer Abdul MA - 12/15/2020  1:17 PM CSTPatient advised 
and aware

Patient verbalized her understanding

Transferred to [facility] for appointment

Eliezer Abdul MA  12/15/2020  1:17 PM

Electronically signed by Eliezer Abdul MA at 12/15/2020  1:17 PM CSTTelephone 
Encounter - Rachael Arana PA-C - 2020 10:13 AM CSTPatient will need 
colpo with Dr. Saldana.



Please inform patient.



Thank you,



Rachael Arana PA-C



Electronically signed by Rachael Arana PA-C at 2020 10:14 AM CST
Telephone Encounter - Eliezer Abdul MA - 2020  8:30 AM CSTMDL Pap Results:



Pap: ASCUS , cannot exclude HGSIL



HR HPV: Positive for #52



Forwarded to Rachael for review.

Will place original on desk for signature and review and f/u as indicated.



Will be scanned into EPIC

Eliezer Abdul MA  2020  8:32 AM

Electronically signed by Eliezer Abdul MA at 2020  8:33 AM CSTdocumented 
in this encounter



Plan of Treatment







             Date         Type         Specialty    Care Team    Description

 

             2021   Nurse Visit  Obstetrics & Gynecology Nurse, [facility]

s Health 

 

             2021   Nurse Visit  Obstetrics & Gynecology Nurse, [facility]

s [facility] 

 

                2021      Office Visit    Obstetrics & Gynecology Rachael Arana PA-C



                                        



                                                                [address] [place] [address]  [phone] (Fax) 









                Health Maintenance Due Date        Last Done       Comments

 

                PNEUMOCOCCAL 0-64 YEARS COMBINED SERIES (1 of 1 - 1999    

                  



                PPSV23)                                         

 

                Depression Screening 2005                      

 

                INFLUENZA VACCINE (#1) 2020                      

 

                PAP SMEAR       2022      

 

                DTaP,Tdap,and Td Vaccines (2 - Td) 2026   

   



documented as of this encounter



Results

Not on filedocumented in this encounter



Insurance







          Payer     Benefit Plan / Subscriber ID Effective Phone     Address   T

[facility] [place] [facility]



                    Group               [facility] [id number] [address]  [address]

aid



           HEALTH CHOICE - HEALTH CHOICE            nt                    [id number]

1



                                        



          MANAGED MEDICAID [place]                                          [id number] 



documented as of this encounter

## 2021-01-04 NOTE — XMS REPORT
Summary of Care

                           Created on:2020



Patient:Nickie Keenan

Sex:Female

:1995

External Reference #:[id number]





Demographics







                          Address                   [address] [place] [address]

 

                          Mobile Phone              [phone]

 

                          Home Phone                [phone]

 

                          Email Address             [email or web address]

 

                          Preferred Language        English

 

                          Marital Status            Single

 

                          Taoist Affiliation     Unknown

 

                          Race                      Black or 

 

                          Ethnic Group              Not  or 









Author







                          Organization              [facility]

 

                          Address                   [address] [place] [address]









Support







                Name            Relationship    Address         Phone

 

                Anisa Espana      Unavailable     Unavailable     [phone]









Care Team Providers







                    Name                Role                Phone

 

                    Suzette Martines MD Primary Care Provider [phone]









Encounter Details







             Date         Type         Department   Care Team    Description

 

                    2020          Orders Only         [facility]



                          Doctor Unassigned, No     



                                                            [address] [facility]



                                        Name



                                        



                                                [place] [address] [address] [place] [address] 







Allergies

No Known Allergiesdocumented as of this encounter (statuses as of 2020)



Medications







          Medication Sig       Dispensed Refills   Start     End       Status



                                                  Date      Date      

 

          medroxyPROGESTERone 150 medroxyprogesterone           0               

              Active



          mg/mL injection 150 mg/mL                                         



                    intramuscular                                         



                    suspension                                         

 

          PARoxetine 10 mg Take 1 tablet by mouth 30 tablet 2         20  

          Active



          tabletIndications: daily.                        20                  



          Anxiety and depression                                                

   



documented as of this encounter (statuses as of 2020)



Active Problems







                          Problem                   Noted Date

 

                          B12 deficiency            2019









                                        Overview: 







                                        B12 level <400, prescribed an oral suppl

ement









                          Tobacco use               10/11/2019

 

                          Anxiety and depression    10/11/2019

 

                          Skin hypopigmentation     10/11/2019

 

                          Abnormal Pap smear of cervix 10/01/2019

 

                          Seasonal allergies        10/01/2019



documented as of this encounter (statuses as of 2020)



Immunizations







                    Name                Administration Dates Next Due

 

                    TDAP                2016          



documented as of this encounter



Social History







             Tobacco Use  Types        Packs/Day    Years Used   Date

 

             Current Some Day Smoker Cigarettes                             









                Smokeless Tobacco: Never Used                                 









                                        Comments: 1 Pack per 2 weeks









                Alcohol Use     Drinks/Week     oz/Week         Comments

 

                Yes                                             ocassionally- on

ce a week









                          Sex Assigned at Birth     Date Recorded

 

                          Not on file               









                    COVID-19 Exposure   Response            Date Recorded

 

                    In the last month, have you been in contact with No / Unsure

         2020 10:01 AM 

CST



                    someone who was confirmed or suspected to have              

       



                    Coronavirus / COVID-19?                     



documented as of this encounter



Last Filed Vital Signs

Not on filedocumented in this encounter



Plan of Treatment







             Date         Type         Specialty    Care Team    Description

 

                2020      Office Visit    Obstetrics & Gynecology Rachael Arana PA-C



                                        



                                                                [address] [place] [address]

 [phone] (Fax) 









                Health Maintenance Due Date        Last Done       Comments

 

                PNEUMOCOCCAL 0-64 YEARS COMBINED SERIES ( of  - 1999    

                  



                PPSV23)                                         

 

                Depression Screening 2005                      

 

                INFLUENZA VACCINE (#1) 2020                      

 

                PAP SMEAR       2022      

 

                DTaP,Tdap,and Td Vaccines (2 - Td) 2026   

   



documented as of this encounter



Procedures







             Procedure Name Priority     Date/Time    Associated Diagnosis Comme

nts

 

             ASSIGNMENT OF BENEFITS Routine      2020 10:02 AM CST        

      



documented in this encounter



Results

Not on filedocumented in this encounter



Insurance







          Payer     Benefit Plan / Subscriber ID Effective Phone     Address   T

[facility] [id number] 2018-[employer]           [address]

aid



           HEALTH CHOICE - HEALTH CHOICE            [place]                    [id number]

1



                                        



          MANAGED   MEDICAID                                [place], [place] 



          MEDICAID                                          [id number] 



documented as of this encounter

## 2021-01-04 NOTE — XMS REPORT
Summary of Care

                          Created on:2020



Patient:Nickie Keenan

Sex:Female

:1995

External Reference #:[id number]





Demographics







                          Address                   [address] [place] [address]

 

                          Mobile Phone              [phone]

 

                          Home Phone                [phone]

 

                          Email Address             [email or web address]

 

                          Preferred Language        English

 

                          Marital Status            Single

 

                          Mosque Affiliation     Unknown

 

                          Race                      Black or 

 

                          Ethnic Group              Not  or 









Author







                          Organization              [facility]

 

                          Address                   [address] [place] [id number]









Support







                Name            Relationship    Address         Phone

 

                Anisa Espana      Unavailable     Unavailable     [phone]









Care Team Providers







                    Name                Role                Phone

 

                    Suzette Martines MD Primary Care Provider [phone]









Reason for Visit







                          Reason                    Comments

 

                          Well Woman Exam           







Encounter Details







             Date         Type         Department   Care Team    Description

 

             2020   Office Visit [facility] Rachael Arana, Well

 woman exam 

with routine gynecological exam (Primary Dx);



                                                            [facility]



                                        Screening breast examination;



                                       [address] [place] [address] Depo-Pr

overa contraceptive status;



                                                            [address], [address]



                                        Drive



                                        Need for HPV vaccine;



                                                [place], [place]    [address]



                                        Papanicolaou smear of cervix with low gr

primo squamous intraepithelial lesion 

(LGSIL)



                                                            [id number]



                          [place], [place]              



                                                [phone] [id number] [phone] 



                                                    (Fax)        







Allergies

No Known Allergiesdocumented as of this encounter (statuses as of 2020)



Medications







          Medication Sig       Dispensed Refills   Start     End       Status



                                                  Date      Date      

 

          medroxyPROGESTERone 150 medroxyprogesterone           0               

              Active



          mg/mL injection 150 mg/mL                                         



                    intramuscular                                         



                    suspension                                         

 

          PARoxetine 10 mg Take 1 tablet by mouth 30 tablet 2         20  

          Active



          tabletIndications: daily.                        20                  



          Anxiety and depression                                                

   



documented as of this encounter (statuses as of 2020)



Active Problems







                          Problem                   Noted Date

 

                          B12 deficiency            2019









                                        Overview: 







                                        B12 level <400, prescribed an oral suppl

ement









                          Tobacco use               10/11/2019

 

                          Anxiety and depression    10/11/2019

 

                          Skin hypopigmentation     10/11/2019

 

                          Abnormal Pap smear of cervix 10/01/2019

 

                          Seasonal allergies        10/01/2019



documented as of this encounter (statuses as of 2020)



Immunizations







                    Name                Administration Dates Next Due

 

                    HPV9                2020

 

                    TDAP                2016          



documented as of this encounter



Social History







             Tobacco Use  Types        Packs/Day    Years Used   Date

 

             Current Some Day Smoker Cigarettes                             









                Smokeless Tobacco: Never Used                                 









                                        Tobacco Cessation: Ready to Quit: No; Co

unseling Given: Yes



                                        Comments: 1 Pack per 2 weeks









                Alcohol Use     Drinks/Week     oz/Week         Comments

 

                Yes                                             ocassionally- on

ce a week









                          Sex Assigned at Birth     Date Recorded

 

                          Not on file               









                    COVID-19 Exposure   Response            Date Recorded

 

                    In the last month, have you been in contact with No / Unsure

         2020  7:56 AM

CST



                    someone who was confirmed or suspected to have              

       



                    Coronavirus / COVID-19?                     



documented as of this encounter



Last Filed Vital Signs







                Vital Sign      Reading         Time Taken      Comments

 

                Blood Pressure  112/78          2020  8:10 AM 



                                                CST             

 

                Pulse           78              2020  8:10 AM 



                                                CST             

 

                Temperature     36.8 C (98.2 F) 2020  8:10 AM 



                                                CST             

 

                Respiratory Rate 18              2020  8:10 AM 



                                                CST             

 

                Oxygen Saturation -               -               

 

                Inhaled Oxygen Concentration -               -               

 

                Weight          62.1 kg (136 lb 12.8 oz) 2020  8:10 AM 



                                                CST             

 

                Height          157.5 cm (5' 2") 2020  8:10 AM 



                                                CST             

 

                Body Mass Index 25.02           2020  8:10 AM 



                                                CST             



documented in this encounter



Progress Notes

Savannah Sultana RN - 2020  8:00 AM CST25 year old female has been 
identified by  and name.  Verbal consent has been obtained by patientto have 
an injection, as ordered by the provider.



NDC (National Drug Code) 4217-5037-99.

Patient or state-supplied medications?: no



Has ABN (Advanced Beneficiary Notice) been completed?  No



Previous/Current Encounter Diagnosis:  Need for HPV vaccine



The site was cleaned with an alcohol swab and given intramuscularly (IM).  A 
band aid dressing was then applied to the injection site.  The patient tolerated
the procedure well and was observed for 20 minutes after the injection for 
possible reaction.



Patient provided with preferred teaching of verbal information on Anaphylaxis. 
Shows readiness to learn. Verbal/Written instruction teaching provided. 
Individual is able to read and verbalizes understanding of teaching provided. 
Signs and Symptoms of Anaphylaxis (severe allergic reaction) are: Tingling, 
itching or metallic taste in mouth; hives; difficulty breathing; swelling and/or
itching of mouth and/or throat; diarrhea, vomiting, cramps and stomach pain; 
paleness; loss of consciousness. IF YOU HAVE ANY OF THE SYMPTOMS ABOVE, ACT 
FAST!!! CALL 911 IMMEDIATELY IF YOU HAVE A PRESCRIBED EPI-PEN PLEASE USE IT NOW.

Electronically signed by Savannah Sultana RN at 2020  9:02 AM Rachael Castillo PA-C - 2020  8:00 AM CSTFormatting of this note might be 
different from the original.

Chief complaint:

Chief Complaint

Patient presents with

 Well Woman Exam



HPI

Nickie Keenan is a 25 year old female  presenting for well woman exam.

She is particularly concerned about WWE

The patient has a Body mass index is 25.02 kg/m..  She is working on eating 
healthier and exercising more.



The patient is  not concerned about her menstrual cycles.  Her cycles are not 
present due to depo.

The patient is sexually active.  She currently has 1 sexual partner(s).  She is 
offered sexually transmitted disease testing and declines.

She has had 1 sexual partners in the past year.  She engages in vaginal and oral
sex.  She prefers men.

She is currently using depo for contraception.

The patient denies any urinary incontinence.  She denies any fecal incontinence.



Her last pap smear was in 2019 and was abnormal. LGSIL. Patient reports has had 
a hx of abnormal papsmears before. Patient reports she has seen Dr. Ferrell and 
had a colpo. Will request medical records.  Her next pap smear is due today..

She engages in breast self awareness.  She denies any breast changes.



She denies any family history of breast, ovarian, uterine cancer.

Patient reports has paternal grandfather that had colon cancer.



She has not had a flu shot this year.  Patient declines.

The patient feels safe at home.



She denies any history of drug use.  She does smoke.  She  drinks socially.



Her mood is good.



Histories

OB History

 Para Term  AB Living

1 1 1     1

SAB TAB Ectopic Multiple Live Births

        1



# Outcome Date GA Lbr Rj/2nd Weight Sex Delivery Anes PTL Lv

1 Term 16   6 lb (2.722 kg)  Vag-Spont   JOSE ARMANDO



Past Medical History:

Diagnosis Date

 Abnormal Pap smear of cervix 10/1/2019

 Anxiety and depression 10/11/2019

 B12 deficiency 2019

 B12 level &lt;400, prescribed an oral supplement

 Seasonal allergies 10/1/2019

 Skin hypopigmentation 10/11/2019



Family History

Problem Relation Age of Onset

 Anxiety Mother

 Hypertension Father

 Diabetes Father

 Depression Father

 Anxiety Father

 Asthma Father

 CHF (congestive heart failure) Father

 Pancreatic Cancer Maternal Grandmother

 Cancer Maternal Grandfather

     throat cancer

 Colon Cancer Paternal Grandfather



Family Status

Relation Name Status

 Mo  Alive

 Fa  Alive

 MGMo  (Not Specified)

 MGFa  (Not Specified)

 PGFa  (Not Specified)



Past Surgical History:

Procedure Laterality Date

 COLPOSCOPY



Social History



Socioeconomic History

 Marital status: Single

  Spouse name: Not on file

 Number of children: Not on file

 Years of education: Not on file

 Highest education level: Not on file

Occupational History

 Not on file

Social Needs

 Financial resource strain: Not on file

 Food insecurity

  Worry: Not on file

  Inability: Not on file

 Transportation needs

  Medical: Not on file

  Non-medical: Not on file

Tobacco Use

 Smoking status: Current Some Day Smoker

  Types: Cigarettes

 Smokeless tobacco: Never Used

 Tobacco comment: 1 Pack per 2 weeks

Substance and Sexual Activity

 Alcohol use: Yes

  Comment: ocassionally- once a week

 Drug use: Yes

  Types: Marijuana

 Sexual activity: Yes

  Partners: Male

  Birth control/protection: Injection

Lifestyle

 Physical activity

  Days per week: Not on file

  Minutes per session: Not on file

 Stress: Not on file

Relationships

 Social connections

  Talks on phone: Not on file

  Gets together: Not on file

  Attends [employer] service: Not on file

  Active member of club or organization: Not on file

  Attends meetings of clubs or organizations: Not on file

  Relationship status: Not on file

 Intimate partner violence

  Fear of current or ex partner: Not on file

  Emotionally abused: Not on file

  Physically abused: Not on file

  Forced sexual activity: Not on file

Other Topics Concern

 Not on file

Social History Narrative

 Denies physical and sexual abuse.



Social History



Substance and Sexual Activity

Sexual Activity Yes

 Partners: Male

 Birth control/protection: Injection







Labs

none



Radiology

none



Allergies

Nickie has No Known Allergies.



Medications

Nickie has a current medication list which includes the following 
prescription(s): paroxetine and medroxyprogesterone.



Review of Systems

Constitutional: Negative for appetite change, fatigue, fever, unexpected weight 
change, weight gain and weight loss.

HENT: Negative for rhinorrhea and sore throat.

Eyes: Negative for pain and itching.

Respiratory: Negative for cough, chest tightness and shortness of breath.

Breasts: Negative for discharge, mass and pain.

Cardiovascular: Negative for chest pain, palpitations and leg swelling.

Gastrointestinal: Negative for abdominal pain, constipation, diarrhea and 
nausea.

Genitourinary: Negative for bladder incontinence, dysuria, vaginal discharge, 
difficulty urinating, vaginal pain and pelvic pain.

Musculoskeletal: Negative for gait problem and myalgias.

Skin: Negative for rash.

Neurological: Negative for dizziness and headaches.

Psychiatric/Behavioral: Negative for suicidal ideas. The patient is not 
nervous/anxious.

Endocrine: Negative for hair loss, weight gain and weight loss.



/78 (BP Location: Left arm, Patient Position: Sitting, BP CUFF SIZE: Adult
Medium)  | Pulse 78 | Temp 36.8 C (98.2 F) (Oral)  | Resp 18  | Ht 5' 2" 
(1.575 m)  | Wt 136 lb 12.8 oz (62.1 kg)  | LMP  (LMP Unknown)  | BMI 25.02 
kg/m

Pregravid BMI: Could not be calculated



Physical Exam

Vitals reviewed.

Constitutional: She is oriented to person, place, and time. She appears well-
developed and well-nourished.

Neck: No mass. No thyromegaly palpated. No neck adenopathy.

Cardiovascular: Regular rate and rhythm.

Pulmonary/Chest: Normal inspiratory effort.

Abdominal: Abdomen is soft. No tenderness present. No hernia palpated or 
inspected.

Neuro/Psychiatric: She has a normal mood and affect. She is oriented to person, 
place, and time.

Skin: Skin normal.

Lymphadenopathy: No neck adenopathy present. No axillary adenopathy present. No 
inguinal adenopathy present.



Breast: Right breast exhibits no mass, no nipple discharge and no tenderness. 
Left breast exhibits no mass, no nipple discharge and no tenderness. Breasts are
symmetrical. Normal left breast and normalright breast

External genitalia: Normal external genitalia appropriate for age.

Urethral meatus: Normal urethral meatus

Urethra: Normal urethra.

Bladder: No tenderness.  Normal bladder

Vagina:Normal vagina. No lesion inspected. No abnormal vaginal discharge found. 
  No lesions in thevagina.



Cervix: Normal cervix. No lesion. No tenderness and no discharge present.

Uterus: Uterus is non-tender. Normal uterus

Adnexa: Right adnexa without tenderness. Left adnexa without tenderness. Normal 
left adnexa and normal right adnexa

Anus/perineum: Normal perineum and normal anus.







Assessment/Plan

Well woman exam with routine gynecological exam  (primary encounter diagnosis)

FOLLOW-UP in 1 yr WWE



HPV vaccine

-series began today. Patient education given.



On depo

For Depo-provera, it is given every 3 months.  May cause heavy and irregular 
bleeding initially.  Side effects include but not limited to headache, weight 
gain, mood lability, breast tenderness, and abnormal uterine bleeding.  
Discussed need for calcium and weightbearing exercise and stressed importance of
continued use of condoms for STD protection. It may take up to a year after 
discontinuation of medication to regain ovulation. Discussed risks and benefits 
of medication and questions answered.



Screening breast examination

No complaints, self awareness.



Return to clinic in 1 yr WWE

Discussed treatment options.

Reviewed patient instructions and provided printed copy.



This visit did not involve counseling and coordination that comprised more than 
50% of the visit time.



Rachael Arana PA-C  2020  8:13 AM



Electronically signed by Rachael Arana PA-C at 2020  9:18 AM CST
documented in this encounter



Plan of Treatment







             Date         Type         Specialty    Care Team    Description

 

             2021   Nurse Visit  Obstetrics & Gynecology Nurse, [facility]

s Health 

 

             2021   Nurse Visit  Obstetrics & Gynecology Nurse, [facility]

s [facility] 

 

                2021      Office Visit    Obstetrics & Gynecology Rachael Arana PA-C



                                        



                                                                [address] [place] [address]

 [phone] (Fax) 









                Health Maintenance Due Date        Last Done       Comments

 

                PNEUMOCOCCAL 0-64 YEARS COMBINED SERIES ( of  - 1999    

                  



                PPSV23)                                         

 

                Depression Screening 2005                      

 

                INFLUENZA VACCINE (#1) 2020                      

 

                PAP SMEAR       2022      

 

                DTaP,Tdap,and Td Vaccines (2 - Td) 2026   

   



documented as of this encounter



Procedures







             Procedure Name Priority     Date/Time    Associated Diagnosis Comme

nts

 

             GARDASIL 9 (HPV 9V) Routine      2020  8:47 AM CST Need for H

PV vaccine 



             VACCINE                                             



documented in this encounter



Results

Not on filedocumented in this encounter



Visit Diagnoses







                                        Diagnosis

 

                                        Well woman exam with routine gynecologic

al exam - Primary







                                        Routine gynecological examination

 

                                        Screening breast examination







                                        Other screening breast examination

 

                                        Depo-Provera contraceptive status







                                        Surveillance of other previously prescri

bed contraceptive method

 

                                        Need for HPV vaccine







                                        Need for prophylactic vaccination and in

oculation against other viral diseases

 

                                        Papanicolaou smear of cervix with low gr

primo squamous intraepithelial lesion 

(LGSIL)



documented in this encounter



Insurance







          Payer     Benefit Plan / Subscriber ID Effective Phone     Address   T

ype



                    Group               Dates                         

 

          [facility] [id number] 2018-Jamia           [address]

aid



           [employer] - [facility]            [place]                    [id number]

1



                                        



          MANAGED   MEDICAID                                [place], [place]                                          [id number] 









           Guarantor Name Account Type Relation to Date of    Phone      Billing



                                 Patient    Birth                 Address

 

           Nickie Keenan Personal/Family Self       1995 [phone]



                                                       (Home)     [place], [place]



                                                                  [id number]



documented as of this encounter

## 2021-09-30 ENCOUNTER — HOSPITAL ENCOUNTER (EMERGENCY)
Dept: HOSPITAL 97 - ER | Age: 26
Discharge: HOME | End: 2021-09-30
Payer: COMMERCIAL

## 2021-09-30 VITALS — DIASTOLIC BLOOD PRESSURE: 92 MMHG | SYSTOLIC BLOOD PRESSURE: 122 MMHG

## 2021-09-30 VITALS — OXYGEN SATURATION: 100 % | TEMPERATURE: 97.3 F

## 2021-09-30 DIAGNOSIS — S29.012A: Primary | ICD-10-CM

## 2021-09-30 LAB
ALBUMIN SERPL BCP-MCNC: 4.1 G/DL (ref 3.4–5)
ALP SERPL-CCNC: 40 U/L (ref 45–117)
ALT SERPL W P-5'-P-CCNC: 24 U/L (ref 12–78)
AST SERPL W P-5'-P-CCNC: 12 U/L (ref 15–37)
BUN BLD-MCNC: 13 MG/DL (ref 7–18)
GLUCOSE SERPLBLD-MCNC: 103 MG/DL (ref 74–106)
HCT VFR BLD CALC: 37 % (ref 36–45)
INR BLD: 1.07
LYMPHOCYTES # SPEC AUTO: 2.7 K/UL (ref 0.7–4.9)
MAGNESIUM SERPL-MCNC: 2.1 MG/DL (ref 1.8–2.4)
NT-PROBNP SERPL-MCNC: 15 PG/ML (ref ?–125)
PMV BLD: 7.9 FL (ref 7.6–11.3)
POTASSIUM SERPL-SCNC: 3.8 MMOL/L (ref 3.5–5.1)
RBC # BLD: 4.37 M/UL (ref 3.86–4.86)
SP GR UR: >1.03 (ref 1–1.03)
TROPONIN (EMERG DEPT USE ONLY): < 0.02 NG/ML (ref 0–0.04)

## 2021-09-30 PROCEDURE — 81025 URINE PREGNANCY TEST: CPT

## 2021-09-30 PROCEDURE — 96375 TX/PRO/DX INJ NEW DRUG ADDON: CPT

## 2021-09-30 PROCEDURE — 99285 EMERGENCY DEPT VISIT HI MDM: CPT

## 2021-09-30 PROCEDURE — 81003 URINALYSIS AUTO W/O SCOPE: CPT

## 2021-09-30 PROCEDURE — 84484 ASSAY OF TROPONIN QUANT: CPT

## 2021-09-30 PROCEDURE — 85379 FIBRIN DEGRADATION QUANT: CPT

## 2021-09-30 PROCEDURE — 85025 COMPLETE CBC W/AUTO DIFF WBC: CPT

## 2021-09-30 PROCEDURE — 96374 THER/PROPH/DIAG INJ IV PUSH: CPT

## 2021-09-30 PROCEDURE — 83880 ASSAY OF NATRIURETIC PEPTIDE: CPT

## 2021-09-30 PROCEDURE — 71045 X-RAY EXAM CHEST 1 VIEW: CPT

## 2021-09-30 PROCEDURE — 71275 CT ANGIOGRAPHY CHEST: CPT

## 2021-09-30 PROCEDURE — 36415 COLL VENOUS BLD VENIPUNCTURE: CPT

## 2021-09-30 PROCEDURE — 80048 BASIC METABOLIC PNL TOTAL CA: CPT

## 2021-09-30 PROCEDURE — 83735 ASSAY OF MAGNESIUM: CPT

## 2021-09-30 PROCEDURE — 93005 ELECTROCARDIOGRAM TRACING: CPT

## 2021-09-30 PROCEDURE — 80076 HEPATIC FUNCTION PANEL: CPT

## 2021-09-30 PROCEDURE — 85610 PROTHROMBIN TIME: CPT

## 2021-09-30 NOTE — EDPHYS
Physician Documentation                                                                           

 [facility]                                                                 

Name: Nickie Keenan                                                                             

Age: 26 yrs                                                                                       

Sex: Female                                                                                       

: 1995                                                                                   

MRN: [id number]                                                                                   

Arrival Date: 2021                                                                          

Time: 07:29                                                                                       

Account#: [id number]                                                                            

Bed 23                                                                                            

Private MD:                                                                                       

ED Physician Allan Mejias                                                                         

HPI:                                                                                              

                                                                                             

08:14 This 26 yrs old  Female presents to ER via Ambulatory with complaints of        jmm 

      Shoulder Pain, Chest Pain.                                                                  

08:14 The patient or guardian complains of pain. Onset: The symptoms/episode began/occurred   jmm 

      gradually, this morning. Modifying factors: the symptoms are alleviated by nothing. The     

      symptoms are aggravated by deep breath. Associated signs and symptoms: The patient has      

      no apparent associated signs or symptoms, Pertinent positives: chest pain, Pertinent        

      negatives: shortness of breath. This is a 26-year-old female with no known chronic          

      medical conditions presents emerge department with complaints of right upper back pain      

      which radiates into her chest. Patient denies history of coronary artery disease,           

      hypertension, hyperlipidemia. But states both of her parents have had acute myocardial      

      infarction's and she stated that her mother had a similar presentation with hers.           

      Patient did state that yesterday she did perform strenuous activity where she was a         

      body sculpting a patron for approximately 2 hours..                                         

                                                                                                  

- Immunization history:: Adult Immunizations up to date.                                          

- Social history:: Smoking status: Reported history of juuling and/or vaping.                     

                                                                                                  

                                                                                                  

ROS:                                                                                              

08:14 Constitutional: Negative for fever, chills, and weight loss, Cardiovascular: Negative   jmm 

      for chest pain, palpitations, and edema, Respiratory: Negative for shortness of breath,     

      cough, wheezing, and pleuritic chest pain.                                                  

08:14 MS/extremity: Positive for pain.                                                            

08:14 All other systems are negative.                                                             

                                                                                                  

Exam:                                                                                             

08:14 Constitutional:  This is a well developed, well nourished patient who is awake, alert,  jmm 

      and in no acute distress. Head/Face:  atraumatic. Eyes:  EOMI, no conjunctival erythema     

      appreciated ENT:  Moist Mucus Membranes Neck:  Trachea midline, Supple Chest/axilla:        

      Normal chest wall appearance and motion.   Cardiovascular:  Regular rate and rhythm.        

      No edema appreciated Respiratory:  Normal respirations, no respiratory distress             

      appreciated Abdomen/GI:  Non distended, soft                                                

08:14 Skin:  General appearance color normal MS/ Extremity:  Moves all extremities, no            

      obvious deformities appreciated, no edema noted to the lower extremities  Neuro:  Awake     

      and alert, normal gait Psych:  Behavior is normal, Mood is normal, Patient is               

      cooperative and pleasant                                                                    

08:14 Back: pain, that is mild, of the  right scapular area, ROM is normal.                       

                                                                                                  

Vital Signs:                                                                                      

07:42  / 81; Pulse 72; Resp 18; Temp 97.3; Pulse Ox 100% ; Weight 63.5 kg; Height 5 ft. ch5 

      3 in. (160.02 cm); Pain 8/10;                                                               

09:10  / 88; Pulse 70; Resp 16; Pulse Ox 100% ; Pain 4/10;                              tc5 

09:53  / 65; Pulse 72; Resp 15; Pulse Ox 100% ;                                         tc5 

10:43  / 92; Pulse 69; Resp 16; Pulse Ox 100% ; Pain 0/10;                              tc5 

07:42 Body Mass Index 24.80 (63.50 kg, 160.02 cm)                                             ch5 

                                                                                                  

MDM:                                                                                              

07:59 Patient medically screened.                                                             [facility] 

10:51 Data reviewed: vital signs, nurses notes. Counseling: I had a detailed discussion with  victor hugo 

      the patient and/or guardian regarding: the historical points, exam findings, and any        

      diagnostic results supporting the discharge/admit diagnosis, lab results, radiology         

      results, the need for outpatient follow up, to return to the emergency department if        

      symptoms worsen or persist or if there are any questions or concerns that arise at          

      home. ED course: Patient is alert nontoxic in appearance NAD. I believe the patient         

      does have a low likelihood of ACS. Cardiac enzymes are negative. CTA was negative for       

      PE, pneumothorax, pneumonia. Pain most likely musculoskeletal. Pain has decreased while     

      in the ED. Patient is otherwise given strict return precautions. Patient understood and     

      agrees plan of care..                                                                       

                                                                                                  

                                                                                             

08:00 Order name: Basic Metabolic Panel; Complete Time: :                                 [facility] 

                                                                                             

08:00 Order name: CBC with Diff; Complete Time: :                                         [facility] 

                                                                                             

08:00 Order name: LFT's; Complete Time: :                                                 [facility] 

                                                                                             

08:00 Order name: Magnesium; Complete Time: :16                                             [facility] 

                                                                                             

08:00 Order name: NT PRO-BNP; Complete Time: :                                            [facility] 

                                                                                             

08:00 Order name: PT-INR; Complete Time: :                                                [facility] 

                                                                                             

08:00 Order name: Troponin (emerg Dept Use Only); Complete Time: 09:16                        [facility] 

                                                                                             

08:00 Order name: XRAY Chest (1 view); Complete Time: 09:16                                   [facility] 

                                                                                             

08:00 Order name: D-Dimer; Complete Time: 08:43                                               [facility] 

                                                                                             

08:13 Order name: Urine Dipstick-Ancillary; Complete Time: 08:27                              [facility]

                                                                                             

08:16 Order name: Urine Pregnancy--Ancillary (enter results)                                  mt  

                                                                                             

08:43 Order name: CT Chest For PE Angio; Complete Time: 09:57                                 [facility] 

                                                                                             

08:00 Order name: EKG; Complete Time: 08:00                                                   [facility] 

                                                                                             

08:00 Order name: Cardiac monitoring; Complete Time: 08:22                                    [facility] 

                                                                                             

08:00 Order name: EKG - Nurse/Tech; Complete Time: 08:22                                      [facility] 

                                                                                             

08:00 Order name: IV Saline Lock; Complete Time: 08:22                                        [facility] 

                                                                                             

08:00 Order name: Labs collected and sent; Complete Time: 08:22                               [facility] 

                                                                                             

08:00 Order name: O2 Per Protocol; Complete Time: 08:22                                       [facility] 

                                                                                             

08:00 Order name: O2 Sat Monitoring; Complete Time: 08:22                                     [facility] 

                                                                                             

08:00 Order name: Urine Pregnancy Test (obtain specimen); Complete Time: 08:22                [facility] 

                                                                                                  

Administered Medications:                                                                         

10:19 Drug: Ketorolac 30 mg Route: IVP; Site: right antecubital;                              tc5 

10:42 Follow up: Response: No adverse reaction; Pain is decreased                             tc5 

10:42 Drug: Valium (diazepam) 2 mg Route: IVP; Site: right antecubital;                       tc5 

10:42 Follow up: Response: Anxiety decreased                                                  tc5 

                                                                                                  

                                                                                                  

Disposition:                                                                                      

11:14 Co-signature as Attending Physician, Allan Mejias MD I agree with the assessment and     rn  

      plan of care. Attestation: The patient's history, exam findings, diagnostics, and a         

      summary of any interventions or procedures was reviewed in detail with Jeramy ALMONTE.     

                                                                                                  

Disposition Summary:                                                                              

21 10:52                                                                                    

Discharge Ordered                                                                                 

      Location: Home                                                                          [facility] 

      Condition: Stable                                                                       [facility] 

      Diagnosis                                                                                   

        - Strain of muscle and tendon of back wall of thorax                                  jmm 

        - Chest pain, unspecified                                                             jmm 

      Followup:                                                                               jmm 

        - With: Private Physician                                                                  

        - When: 2 - 3 days                                                                         

        - Reason: Recheck today's complaints, Continuance of care, Re-evaluation by your           

      physician                                                                                   

      Discharge Instructions:                                                                     

        - Discharge Summary Sheet                                                             jmm 

        - Nonspecific Chest Pain, Adult                                                       jmm 

        - Thoracic Strain                                                                     jmm 

      Forms:                                                                                      

        - Medication Reconciliation Form                                                      jmm 

        - Thank You Letter                                                                    jmm 

        - Antibiotic Education                                                                jmm 

        - Prescription Opioid Use                                                             jm 

      Prescriptions:                                                                              

        - orphenadrine citrate 100 mg Oral Tablet Sustained Release                                

            - take 1 tablet by ORAL route 2 times per day As needed; 20 tablet; Refills: 0,   jmm 

      Product Selection Permitted                                                                 

Signatures:                                                                                       

Dispatcher [employer]                           Jeramy López PA PA   jmm                                                  

Allan Mejias MD MD   rn                                                   

Cirilo Lee RN                  RN   ch5                                                  

Elana Carpenter RN                  RN   tc5                                                  

                                                                                                  

**************************************************************************************************

## 2021-09-30 NOTE — ER
Nurse's Notes                                                                                     

 [facility]                                                                 

Name: Nickie Keenan                                                                             

Age: 26 yrs                                                                                       

Sex: Female                                                                                       

: 1995                                                                                   

MRN: [id number]                                                                                   

Arrival Date: 2021                                                                          

Time: 07:29                                                                                       

Account#: [id number]                                                                            

Bed 23                                                                                            

Private MD:                                                                                       

Diagnosis: Strain of muscle and tendon of back wall of thorax;Chest pain, unspecified             

                                                                                                  

Presentation:                                                                                     

                                                                                             

07:42 Chief complaint: Patient states: Pain in right shoulder blade that radiates to chest,   ch5 

      Sudden onset, denies other symptoms. Pain is described as pulsating at worse 8/10.          

      Coronavirus screen: Vaccine status: Patient reports being unvaccinated. Ebola Screen:       

      Patient negative for fever greater than or equal to 101.5 degrees Fahrenheit, and           

      additional compatible Ebola Virus Disease symptoms Patient denies exposure to               

      infectious person. Patient denies travel to an Ebola-affected area in the 21 days           

      before illness onset. Initial Sepsis Screen: Does the patient meet any 2 criteria? No.      

      Patient's initial sepsis screen is negative. Does the patient have a suspected source       

      of infection? No. Patient's initial sepsis screen is negative. Risk Assessment: Do you      

      want to hurt yourself or someone else?. Onset of symptoms was 2021.           

07:42 Method Of Arrival: Ambulatory                                                           5 

07:42 Acuity: NEO 3                                                                           ch5 

                                                                                                  

Triage Assessment:                                                                                

07:42 General: Appears uncomfortable, Behavior is calm, cooperative. Pain: Pain currently is  ch5 

      8 out of 10 on a pain scale.                                                                

                                                                                                  

- Immunization history:: Adult Immunizations up to date.                                          

- Social history:: Smoking status: Reported history of juuling and/or vaping.                     

                                                                                                  

                                                                                                  

Screenin:46 Abuse screen: Denies threats or abuse. Denies injuries from another. Nutritional        ch5 

      screening: No deficits noted. Tuberculosis screening: No symptoms or risk factors           

      identified. Fall Risk None identified.                                                      

                                                                                                  

Assessment:                                                                                       

07:51 General: Appears uncomfortable. Pain: Complains of pain in right scapular area Pain     tc5 

      radiates to right clavicle and anterior aspect of right upper chest. Cardiovascular:        

      Reports pt reports she was getting her son ready for school this am when she began          

      having intermittent pulsating rt scapula pain that radiates to her chest. Respiratory:      

      No deficits noted. GI: No deficits noted. : No deficits noted. Musculoskeletal:           

      Reports pain in right scapular area.                                                        

09:11 General: 0845 lab called with alert D-Dimer 818, Jeramy ALMONTE made aware..                   5 

                                                                                                  

Vital Signs:                                                                                      

07:42  / 81; Pulse 72; Resp 18; Temp 97.3; Pulse Ox 100% ; Weight 63.5 kg; Height 5 ft. ch5 

      3 in. (160.02 cm); Pain 8/10;                                                               

09:10  / 88; Pulse 70; Resp 16; Pulse Ox 100% ; Pain 4/10;                              tc5 

09:53  / 65; Pulse 72; Resp 15; Pulse Ox 100% ;                                         tc5 

10:43  / 92; Pulse 69; Resp 16; Pulse Ox 100% ; Pain 0/10;                              tc5 

07:42 Body Mass Index 24.80 (63.50 kg, 160.02 cm)                                             5 

                                                                                                  

ED Course:                                                                                        

07:29 Patient arrived in ED.                                                                  mr  

07:40 Jeramy Perez PA is PHCP.                                                              [facility] 

07:40 Allan Mejias MD is Attending Physician.                                                [facility] 

07:45 Triage completed.                                                                       [facility] 

07:46 Patient has correct armband on for positive identification. Bed in low position. Call   [facility] 

      light in reach.                                                                             

07:47 Elana Carpenter, RN is Primary Nurse.                                                tc5 

07:54 EKG done, by ED staff.                                                                  tc5 

08:22 Urine Pregnancy--Ancillary (enter results) Sent.                                        5 

08:22 D-Dimer Sent.                                                                           mh5 

08:22 XRAY Chest (1 view) Sent.                                                               mh5 

08:23 Placed in gown. Side rails up X 1. Warm blanket given. Pillow given. Cardiac monitor    mh5 

      on. Pulse ox on. NIBP on.                                                                   

08:23 Basic Metabolic Panel Sent.                                                             mh5 

08:23 CBC with Diff Sent.                                                                     mh5 

08:23 LFT's Sent.                                                                             mh5 

08:23 Magnesium Sent.                                                                         mh5 

08:23 NT PRO-BNP Sent.                                                                        mh5 

08:23 PT-INR Sent.                                                                            mh5 

08:23 Troponin (emerg Dept Use Only) Sent.                                                    mh5 

08:23 Initial lab(s) drawn, by ED staff, sent to lab. Urine collected: clean catch specimen,  5 

      clear, EKG done, reviewed by Jeramy ALMONTE.                                               

08:54 XRAY Chest (1 view) In Process Unspecified.                                             EDMS

09:09 Inserted saline lock: 20 gauge in right antecubital area, using aseptic technique.      tc5 

09:26 CT Chest For PE Angio In Process Unspecified.                                           EDMS

11:01 IV discontinued, intact, bleeding controlled, No redness/swelling at site. Pressure     tc5 

      dressing applied.                                                                           

                                                                                                  

Administered Medications:                                                                         

10:19 Drug: Ketorolac 30 mg Route: IVP; Site: right antecubital;                              tc5 

10:42 Follow up: Response: No adverse reaction; Pain is decreased                             tc5 

10:42 Drug: Valium (diazepam) 2 mg Route: IVP; Site: right antecubital;                       tc5 

10:42 Follow up: Response: Anxiety decreased                                                  tc5 

                                                                                                  

                                                                                                  

Outcome:                                                                                          

10:52 Discharge ordered by MD.                                                                victor hugo 

11:10 Patient left the ED.                                                                    tc5 

                                                                                                  

Signatures:                                                                                       

Dispatcher [employer]                           EDMS                                                 

Jeramy Perez PA PA jmm Rivera, Josie                                 mr Siegel, Reanna                              5                                                  

Cirilo Lee RN                  RN   5                                                  

Elana Carpenter RN                  RN   tc5                                                  

                                                                                                  

**************************************************************************************************

## 2021-09-30 NOTE — RAD REPORT
EXAM DESCRIPTION:  RAD - Chest Single View - 9/30/2021 8:53 am

 

CLINICAL HISTORY:  CHEST PAIN

 

COMPARISON:  Chest Single View dated 11/28/2020; Chest Pa And Lat (2 Views) dated 7/28/2020; Chest Si
ngle View dated 3/18/2020; Chest Single View dated 10/9/2018No comparisons

 

FINDINGS:  Lines: None.

Lungs: No evidence of edema or pneumonia.

Pleural: No significant pleural effusions or pneumothorax.

Cardiac: The heart size is within normal limits.

Bones: No acute fractures.

Other:

 

IMPRESSION:  No acute cardiopulmonary disease.

## 2021-09-30 NOTE — RAD REPORT
EXAM DESCRIPTION:  CT - Chest For Pe Angio - 9/30/2021 9:26 am

 

CLINICAL HISTORY:  CHEST PAIN

 

COMPARISON:  No comparisons

 

FINDINGS:  Chest Wall: No suspicious thyroid nodules or pathologic lymphadenopathy.

Lungs: No acute abnormality.

Pleura: No significant effusions or pneumothorax.

Mediastinum/brielle: No pathologic lymphadenopathy.

Pulmonary arteries/Aorta: No filling defect identified. No aortic aneurysm.

Heart: No significant pericardial effusion. Normal heart size.

Upper abdomen: No acute abnormality.

Bones: No acute abnormality.

 

All CT scans are performed using dose optimization technique as appropriate and may include automated
 exposure control or mA/KV adjustment according to patient size.

 

IMPRESSION:  Negative for pulmonary embolism. No other acute findings are present within chest.

## 2021-10-01 NOTE — EKG
Test Date:    2021-09-30               Test Time:    07:57:57

Technician:   JENNIE                                    

                                                     

MEASUREMENT RESULTS:                                       

Intervals:                                           

Rate:         70                                     

NC:           132                                    

QRSD:         86                                     

QT:           416                                    

QTc:          449                                    

Axis:                                                

P:            40                                     

NC:           132                                    

QRS:          71                                     

T:            68                                     

                                                     

INTERPRETIVE STATEMENTS:                                       

                                                     

Normal sinus rhythm

Normal ECG

No previous ECG available for comparison



Electronically Signed On 10-01-21 10:40:16 CDT by Remberto Vallejo

## 2023-05-23 ENCOUNTER — HOSPITAL ENCOUNTER (EMERGENCY)
Dept: HOSPITAL 97 - ER | Age: 28
LOS: 1 days | Discharge: HOME | End: 2023-05-24
Payer: COMMERCIAL

## 2023-05-23 DIAGNOSIS — R51.9: ICD-10-CM

## 2023-05-23 DIAGNOSIS — Z3A.15: ICD-10-CM

## 2023-05-23 DIAGNOSIS — O26.892: Primary | ICD-10-CM

## 2023-05-23 DIAGNOSIS — R10.2: ICD-10-CM

## 2023-05-23 PROCEDURE — 81003 URINALYSIS AUTO W/O SCOPE: CPT

## 2023-05-23 PROCEDURE — 76815 OB US LIMITED FETUS(S): CPT

## 2023-05-23 NOTE — XMS REPORT
Continuity of Care Document

                             Created on:May 23, 2023



Patient:JESSE NOWAK

Sex:Female

:1995

External Reference #:[id number]





Demographics







                          Address                   [address] [place] [address]

 

                          Home Phone                [phone]

 

                          Mobile Phone              [phone]

 

                          Email Address             [address]

 

                          Preferred Language        English

 

                          Marital Status            Unknown

 

                          Congregational Affiliation     Unknown

 

                          Race                      Unknown

 

                          Additional Race(s)        Black or 



                                                    Unavailable

 

                          Ethnic Group              Not  or 









Author







                          Organization              [facility]

t

 

                          Address                   [address]



                                                    [place] [address]

 

                          Phone                     [phone]









Support







                Name            Relationship    Address         Phone

 

                Anisa Espana     Mother          Unavailable     [phone]









Care Team Providers







                    Name                Role                Phone

 

                    PCP, PATIENT DOES NOT HAVE A Primary Care Physician Unavaila

ble

 

                    EMMANUELLE WEEKS       Attending Clinician Unavailable

 

                    2, [facility]          Attending Clinician Unavailable

 

                    Emmanuelle Weeks MD    Attending Clinician [phone]

 

                    Pob, [facility]   Attending Clinician Unavailable

 

                    Doctor Unassigned, No Name Attending Clinician Unavailable

 

                    RADIOLOGY           Attending Clinician Unavailable

 

                    Radiology           Attending Clinician Unavailable

 

                    Rachael Martinez PA-C Attending Clinician [phone]

 

                    RACHAEL MARTINEZ     Attending Clinician Unavailable

 

                    Unknown, Attending  Attending Clinician Unavailable

 

                    Joyce Wilburn Attending Clinician [phone]

 

                    JOYCE FALLON    Attending Clinician Unavailable

 

                    Etienne Dominguez PA-C   Attending Clinician [phone]

 

                    ETIENNE DOMINGUEZ        Attending Clinician Unavailable

 

                    Nurse, [facility] Attending Clinician Unavailable

 

                    Suzette Jorgensen MD Attending Clinician [phone]

 

                    SUZETTE JORGENSEN Attending Clinician Unavailable

 

                    ROSANNA JETER Attending Clinician Unavailable

 

                    JOSÉ MIGUEL WEEKS            Admitting Clinician Unavailable

 

                    EMMANUELLE WEEKS       Admitting Clinician Unavailable









Payers







           Payer Name Policy Type Policy Number Effective Date Expiration Date MICHAEL busby

 

           [facility]            [id number]  2018            



           [place]ICE [place]                       00:00:00              

 

           [facility]            [id number]  [id number] 



           CHOICE STAR                       00:00:00   00:00:00   







Problems







       Condition Condition Condition Status Onset  Resolution Last   Treating Co

mments 

Source



       Name   Details Category        Date   Date   Treatment Clinician        



                                                 Date                 

 

       Chest pain Chest pain Disease Active 2021-1                             U

nivers



       in adult in adult               0-17                               ity of



                                   00:00:                             [place]



                                   00                                 Medical



                                                                      Branch

 

       Family Family Disease Active 2021                             Univers



       history of history of               0-17                               it

y of



       heart  heart                00:00:                             [place]



       disease in disease in               00                                 Me

dical



       both   both                                                    Branch



       parents parents                                                  

 

       B12    B12    Disease Active 2019                      Overview: Univer

s



       deficiency deficiency               1-13                        Formatt[place]

 ity of



                                   00:00:                      g of this [place]



                                   00                          note   Medical



                                                               might be Branch



                                                               different 



                                                               from the 



                                                               original. 



                                                               B12 level 



                                                               <400,  



                                                               prescribe 



                                                               d an oral 



                                                               supplemen 



                                                               t      

 

       Tobacco Tobacco Disease Active 2019                             Univers



       use    use                  0-11                               ity of



                                   00:00:                             [place]



                                   00                                 Medical



                                                                      Branch

 

       Anxiety Anxiety Disease Active 2019                             Univers



       and    and                  0-11                               ity of



       depression depression               00:00:                             [place]

[place]



                                   00                                 Medical



                                                                      Branch

 

       Skin   Skin   Disease Active 2019                             Univers



       hypopigmen hypopigmen               0-11                               it

y of



       tation tation               00:00:                             [place]



                                   00                                 Medical



                                                                      Branch

 

       Papanicola Papanicola Disease Active 2019                             U

nivers



       ou smear ou smear               0-01                               ity of



       of cervix of cervix               00:00:                             [place]

[place]



       with   with                                                  Medical



       atypical atypical                                                  Branch



       squamous squamous                                                  



       cells  cells                                                   



       cannot cannot                                                  



       exclude exclude                                                  



       high grade high grade                                                  



       squamous squamous                                                  



       intraepith intraepith                                                  



       elial  elial                                                   



       lesion lesion                                                  



       (ASC-H) (ASC-H)                                                  

 

       Seasonal Seasonal Disease Active 2019                             Unive

rs



       allergies allergies               0-01                               ity 

of



                                   00:00:                             [place]



                                   00                                 Medical



                                                                      [place]







Allergies, Adverse Reactions, Alerts







       Allergy Allergy Status Severity Reaction(s) Onset  Inactive Treating Comm

ents 

Source



       Name   Type                        Date   Date   Clinician        

 

       NO KNOWN Drug   Active                                           Univers



       ALLERGIE Class                                                   ity of



       S                                                              [facility]







Social History







           Social Habit Start Date Stop Date  Quantity   Comments   Source

 

           ASSERTION  2023            Pregnant              [facility]



                      00:00:00                                    [place] [facility]



                                                                  Branch

 

           History [facility]                                             University o

f



           Alcohol Frequency                                             [facility]

edical



                                                                  Branch

 

           History [facility]                                             University o

f



           Alcohol Std                                             [place] [facility]



           Drinks                                                 Branch

 

           History [facility]                                             University o

f



           Alcohol Binge                                             [place] [facility]

al



                                                                  Branch

 

           Exposure to 2023 Not sure              University of



           SARS-CoV-2 00:00:00   11:04:00                         [facility]



           (event)                                                Branch

 

           Alcohol intake 2023 Ex-drinker            [facility]



                      00:00:00   00:00:00   (finding)             [facility]

 

           Tobacco use and 2023 Smokeless tobacco            Un

iversity of



           exposure   00:00:00   00:00:00   non-user              [facility]

 

           Tobacco Comment 2023 1 Pack per 2            Univers

ity of



                      00:00:00   00:00:00   weeks,                [facility]

 

           History of            2020 Cigarette Smoker            Universi

ty of



           tobacco use            00:00:00                         [facility]

 

           Alcohol Comment 2019 ocassionally-            Univer

sity of



                      00:00:00   00:00:00   once a week            [facility]

 

           Sex Assigned At 1995                       Universit

y of



           Birth      00:00:00   00:00:00                         [facility]









                Smoking Status  Start Date      Stop Date       Source

 

                Ex-smoker       2023 00:00:00 2023 00:00:00 [facility]







Medications







       Ordered Filled Start  Stop   Current Ordering Indication Dosage Frequency

 Signature

                    Comments            Components          Source



     Medication Medication Date Date Medication? Clinician                (SIG) 

          



     Name Name                                                   

 

     magnesium            Yes       [id number] 400mg      Take 1           U

nivers



     oxide 400      5-17                               tablet by           ity o

f



     mg (241.3      00:00:                               mouth in           [place]

[place]



     mg        00                                 the            Medical



     magnesium)                                         morning.           Branc

h



     tablet                                                        

 

     magnesium            Yes       [id number] 400mg      Take 1           U

nivers



     oxide 400      5-17                               tablet by           ity o

f



     mg (241.3      00:00:                               mouth in           [place]

s



     mg        00                                 the            Medical



     magnesium)                                         morning.           Branc

h



     tablet                                                        

 

     PNV       0      Yes                      Take by           Univers



     no.95/eugenio      3-23                               mouth.           ity of



     us        14:31:                                              [place]



     fum/folic      29                                                Medical



     ac                                                          Branch



     (PRENATAL                                                        



     ORAL)                                                        

 

     PNV       -0      Yes                      Take by           Univers



     no.95/eugenio      3-23                               mouth.           ity of



     us        14:31:                                              [place]



     fum/folic      29                                                Medical



     ac                                                          Branch



     (PRENATAL                                                        



     ORAL)                                                        

 

     PNV       0      Yes                      Take by           Univers



     no.95/eugenio      3-23                               mouth.           ity of



     us        14:31:                                              [place]



     fum/folic      29                                                Medical



     ac                                                          Branch



     (PRENATAL                                                        



     ORAL)                                                        

 

     PNV       0      Yes                      Take by           Univers



     no.95/eugenio      3-23                               mouth.           ity of



     us        14:31:                                              [place]



     fum/folic      29                                                Medical



     ac                                                          Branch



     (PRENATAL                                                        



     ORAL)                                                        

 

     PNV       0      Yes                      Take by           Univers



     no.95/eugenio      3-23                               mouth.           ity of



     us        14:31:                                              [place]



     fum/folic      29                                                Medical



     ac                                                          Branch



     (PRENATAL                                                        



     ORAL)                                                        

 

     PNV       -0      Yes                      Take by           Univers



     no.95/eugenio      3-23                               mouth.           ity of



     us        14:31:                                              [place]



     fum/folic      29                                                Medical



     ac                                                          Branch



     (PRENATAL                                                        



     ORAL)                                                        

 

     PNV       -0      Yes                      Take by           Univers



     no.95/eugenio      3-23                               mouth.           ity of



     us        14:31:                                              [place]



     fum/folic      29                                                Medical



     ac                                                          Branch



     (PRENATAL                                                        



     ORAL)                                                        

 

     PNV       0      Yes                      Take by           Univers



     no.95/eugenio      3-23                               mouth.           ity of



     us        14:31:                                              [place]



     fum/folic      29                                                Medical



     ac                                                          Branch



     (PRENATAL                                                        



     ORAL)                                                        

 

     PNV       0      Yes                      Take by           Univers



     no.95/eugenio      3-23                               mouth.           ity of



     us        14:31:                                              [place]



     fum/folic      29                                                Medical



     ac                                                          Branch



     (PRENATAL                                                        



     ORAL)                                                        

 

     No known      2022      No                       No known           Unive

rs



     medications      2-10                               medication           it

y of



               10:18:                               s              [place]



               27                                                Medical



                                                                 Branch

 

     fluconazole      2022- No        [id number] 150mg      Take 1        

   Univers



     (DIFLUCAN)      2-10 12-11                          tablet by           ity

 of



     150 mg      00:00: 05:59                          mouth once           [place]

s



     tablet      00   :00                           now for 1           Medical



                                                  dose.           Branch

 

     medroxyPROG      2022- No                       medroxypro          

 [facility] [place]



     ESTERone                                gesterone           ity o

f



     150 mg/mL      09:41: 00:00                          150 mg/mL           Te

[place]



     injection      32   :00                           intramuscu           Medi

mihaela



                                                  lar            Branch



                                                  suspension           

 

     medroxyPROG      2022- No                       medroxypro          

 [facility] [place]



     ESTERone                                gesterone           ity o

f



     150 mg/mL      09:41: 00:00                          150 mg/mL           Te

[place]



     injection      32   :00                           intramuscu           Medi

mihaela



                                                  lar            Branch



                                                  suspension           

 

     hydrocortis      2022      Yes       [id number]           Apply to         

  Univers



     one       -17                               external           ity of



     (PROCTOSOL      00:00:                               hemorrhoid           [place]

[place]



     HC) 2.5 %      00                                 BID after           Medic

al



     rectal                                         bowel           Branch



     cream                                         movement.           

 

     hydrocortis      2022      Yes       [id number]           Apply to         

  Univers



     one       -17                               external           ity of



     (PROCTOSOL      00:00:                               hemorrhoid           T

[place]



     HC) 2.5 %      00                                 BID after           Medic

al



     rectal                                         bowel           Branch



     cream                                         movement.           

 

     hydrocortis      2022- No        [id number]           Apply to        

   [facility] [place]



     one       1-17 12-07                          external           ity of



     (PROCTOSOL      00:00: 00:00                          hemorrhoid           

[place]



     HC) 2.5 %      00   :00                           BID after           Medic

al



     rectal                                         bowel           Branch



     cream                                         movement.           

 

     hydrocortis      2022- No        [id number]           Apply to        

   [facility] [place]



     one       1-17 12-07                          external           ity of



     (PROCTOSOL      00:00: 00:00                          hemorrhoid           

[place]



     HC) 2.5 %      00   :00                           BID after           Medic

al



     rectal                                         bowel           Branch



     cream                                         movement.           

 

     Nitrofurant      2022- No        [id number] 100mg      Take 1         

  Univers



     oin&Nit.       08-                          capsule by           ity 

of



     Macrocryst      00:00: 04:59                          mouth in           [place]

[place]



     (MACROBID)      00   :00                           the            Medical



     100 mg                                         morning           Branch



     capsule                                         and 1           



                                                  capsule in           



                                                  the            



                                                  evening.           



                                                  Do all           



                                                  this for 7           



                                                  days.           

 

     medroxyPROG      2021      Yes                      medroxypro           

Univers



     ESTERone      1-04                               gesterone           ity of



     150 mg/mL      15:05:                               150 mg/mL           [facility]

as



     injection      14                                 intramuscu           Medi

mihaela



                                                  lar            Branch



                                                  suspension           

 

     medroxyPROG      2021      Yes                      medroxypro           

Univers



     ESTERone      1-04                               gesterone           ity of



     150 mg/mL      15:05:                               150 mg/mL           [facility]

as



     injection      14                                 intramuscu           Medi

mihaela



                                                  lar            Branch



                                                  suspension           

 

     medroxyPROG      2021      Yes                      medroxypro           

Univers



     ESTERone      1-04                               gesterone           ity of



     150 mg/mL      15:05:                               150 mg/mL           And[facility]

as



     injection      14                                 intramuscu           Medi

mihaela



                                                  lar            [facility]



                                                  suspension           

 

     medroxyPROG      2021      Yes                      medroxypro           

[facility]



     ESTERone      1-04                               gesterone           ity of



     150 mg/mL      15:05:                               150 mg/mL           [facility]

as



     injection      14                                 intramuscu           Medi

mihaela



                                                  lar            Branch



                                                  suspension           

 

     medroxyPROG      2021      Yes                      medroxypro           

[facility]



     ESTERone      1-04                               gesterone           ity of



     150 mg/mL      15:05:                               150 mg/mL           [facility]

as



     injection      14                                 intramuscu           Medi

mihaela



                                                  lar            Branch



                                                  suspension           

 

     medroxyPROG      2021      Yes                      medroxypro           

[facility]



     ESTERone      1-04                               gesterone           ity of



     150 mg/mL      15:05:                               150 mg/mL           [facility]

as



     injection      14                                 intramuscu           Medi

mihaela



                                                  lar            Branch



                                                  suspension           

 

     citalopram      2021      Yes       [id number] 20mg      Take 1           [facility]

[facility]



     20 mg      0-12                               tablet by           ity of



     tablet      00:00:                               mouth           [place]



               00                                 daily.           [facility]

 

     citalopram      2021- No        [id number] 20mg      Take 1           

[facility]



     20 mg      0                          tablet by           ity of



     tablet      00:00: 00:00                          mouth           [place]



               00   :00                           daily.           [facility]

 

     citalopram      2021- No        [id number] 20mg      Take 1           

[facility]



     20 mg                                tablet by           [facility] of



     tablet      00:00: 00:00                          mouth           [place]



               00   :00                           daily.           [facility]







Immunizations







           Ordered Immunization Filled Immunization Date       Status     Commen

ts   Source



           Name       Name                                        

 

           [facility]                  2021 Completed             [facility]



                                 00:00:00                         [facility]                  2021 Completed             [facility]



                                 00:00:00                         [facility]                  2021 Completed             [facility]



                                 00:00:00                         [facility]                  2021 Completed             [facility]



                                 00:00:00                         [facility]                  2021 Completed             [facility]



                                 00:00:00                         [facility]                  2021 Completed             [facility]



                                 00:00:00                         [facility]                  2021 Completed             [facility]



                                 00:00:00                         [facility]                  2021 Completed             [facility]



                                 00:00:00                         [facility]                  2021 Completed             [facility]



                                 00:00:00                         [facility]                  2021 Completed             [facility]



                                 00:00:00                         [facility]                  2021 Completed             [facility]



                                 00:00:00                         [facility]                  2021 Completed             [facility]



                                 00:00:00                         [facility]                  2021 Completed             [facility]



                                 00:00:00                         [facility]                  2021 Completed             [facility]



                                 00:00:00                         [facility]                  2021 Completed             [facility]



                                 00:00:00                         [facility]                  2021 Completed             [facility]



                                 00:00:00                         [facility]                  2021 Completed             [facility]



                                 00:00:00                         [facility]                  2021 Completed             [facility]



                                 00:00:00                         [facility]                  2021 Completed             [facility]



                                 00:00:00                         [facility]



                                                                  [facility]

 

           HPV9                  2021 Completed             [facility]



                                 00:00:00                         [facility]



                                                                  Branch

 

           HPV9                  2021 Completed             [facility]



                                 00:00:00                         [facility]



                                                                  [facility]

 

           HPV9                  2021 Completed             [facility]



                                 00:00:00                         [facility]



                                                                  [facility]

 

           HPV9                  2021 Completed             [facility]



                                 00:00:00                         [facility]



                                                                  [facility]

 

           HPV9                  2021 Completed             [facility]



                                 00:00:00                         [facility]



                                                                  [facility]

 

           HPV9                  2021 Completed             [facility]



                                 00:00:00                         [facility]



                                                                  [facility]

 

           HPV9                  2021 Completed             [facility]



                                 00:00:00                         [facility]



                                                                  Br[facility]

 

           HPV9                  2021 Completed             [facility]



                                 00:00:00                         [facility]



                                                                  [facility]

 

           HPV9                  2021 Completed             [facility]



                                 00:00:00                         [facility]



                                                                  [facility]

 

           HPV9                  2021 Completed             [facility]



                                 00:00:00                         [facility]



                                                                  Br[facility]

 

           HPV9                  2021 Completed             [facility]



                                 00:00:00                         [facility]



                                                                  [facility]

 

           HPV9                  2021 Completed             [facility]



                                 00:00:00                         [facility]



                                                                  [facility]

 

           HPV9                  2021 Completed             [facility]



                                 00:00:00                         [facility]



                                                                  Branch

 

           HPV9                  2021 Completed             [facility]



                                 00:00:00                         [facility]



                                                                  Br[facility]

 

           HPV9                  2021 Completed             [facility]



                                 00:00:00                         [facility]



                                                                  [facility]

 

           HPV9                  2021 Completed             [facility]



                                 00:00:00                         [facility]



                                                                  [facility]

 

           HPV9                  2021 Completed             [facility]



                                 00:00:00                         [facility]



                                                                  [facility]

 

           HPV9                  2021 Completed             [facility]



                                 00:00:00                         [facility]



                                                                  [facility]

 

           HPV9                  2021 Completed             [facility]



                                 00:00:00                         [facility]



                                                                  [facility]

 

           HPV9                  2021 Completed             [facility]



                                 00:00:00                         [facility]



                                                                  [facility]

 

           HPV9                  2021 Completed             [facility]



                                 00:00:00                         [facility]



                                                                  [facility]

 

           HPV9                  2021 Completed             [facility]



                                 00:00:00                         [facility]



                                                                  [facility]

 

           HPV9                  2021 Completed             [facility]



                                 00:00:00                         [facility]



                                                                  [facility]

 

           HPV9                  2021 Completed             [facility]



                                 00:00:00                         [facility]



                                                                  [facility]

 

           HPV9                  2021 Completed             [facility]



                                 00:00:00                         [facility]



                                                                  [facility]

 

           HPV9                  2021 Completed             [facility]



                                 00:00:00                         [facility]



                                                                  [facility]

 

           HPV9                  2021 Completed             [facility]



                                 00:00:00                         [facility]



                                                                  [facility]

 

           HPV9                  2021 Completed             [facility]



                                 00:00:00                         [facility]



                                                                  [facility]

 

           HPV9                  2021 Completed             [facility]



                                 00:00:00                         [facility]



                                                                  [facility]

 

           HPV9                  2021 Completed             [facility]



                                 00:00:00                         [facility]



                                                                  [facility]

 

           HPV9                  2021 Completed             [facility]



                                 00:00:00                         [facility]



                                                                  [facility]

 

           HPV9                  2020 Completed             [facility]



                                 00:00:00                         [facility]



                                                                  [facility]

 

           HPV9                  2020 Completed             [facility]



                                 00:00:00                         [facility]



                                                                  [facility]

 

           HPV9                  2020 Completed             [facility]



                                 00:00:00                         [facility]



                                                                  [facility]

 

           HPV9                  2020 Completed             [facility]



                                 00:00:00                         [facility]



                                                                  [facility]

 

           HPV9                  2020 Completed             [facility]



                                 00:00:00                         [facility]



                                                                  [facility]

 

           HPV9                  2020 Completed             [facility]



                                 00:00:00                         [facility]



                                                                  [facility]

 

           HPV9                  2020 Completed             [facility]



                                 00:00:00                         [facility]



                                                                  [facility]

 

           HPV9                  2020 Completed             [facility]



                                 00:00:00                         [facility]



                                                                  [facility]

 

           HPV9                  2020 Completed             [facility]



                                 00:00:00                         [facility]



                                                                  [facility]

 

           HPV9                  2020 Completed             [facility]



                                 00:00:00                         [facility]



                                                                  [facility]

 

           HPV9                  2020 Completed             [facility]



                                 00:00:00                         [facility]



                                                                  [facility]

 

           HPV9                  2020 Completed             [facility]



                                 00:00:00                         [facility]



                                                                  [facility]

 

           HPV9                  2020 Completed             [facility]



                                 00:00:00                         [facility]



                                                                  [facility]

 

           HPV9                  2020 Completed             [facility]



                                 00:00:00                         [facility]



                                                                  [facility]

 

           HPV9                  2020 Completed             [facility]



                                 00:00:00                         [facility]



                                                                  [facility]

 

           HPV9                  2020 Completed             [facility]



                                 00:00:00                         [facility]



                                                                  [facility]

 

           HPV9                  2020 Completed             [facility]



                                 00:00:00                         [facility]



                                                                  [facility]

 

           HPV9                  2020 Completed             [facility]



                                 00:00:00                         [facility]



                                                                  [facility]

 

           HPV9                  2020 Completed             [facility]



                                 00:00:00                         [facility]



                                                                  [facility]

 

           HPV9                  2020 Completed             [facility]



                                 00:00:00                         [facility]



                                                                  [facility]

 

           HPV9                  2020 Completed             [facility]



                                 00:00:00                         [facility]



                                                                  [facility]

 

           HPV9                  2020 Completed             [facility]



                                 00:00:00                         [facility]



                                                                  [facility]

 

           HPV9                  2020 Completed             [facility]



                                 00:00:00                         [facility]



                                                                  [facility]

 

           HPV9                  2020 Completed             [facility]



                                 00:00:00                         [facility]



                                                                  [facility]

 

           HPV9                  2020 Completed             [facility]



                                 00:00:00                         [facility]



                                                                  [facility]

 

           TDAP                  2016 Completed             [facility]



                                 00:00:00                         [facility]



                                                                  [facility]

 

           TDAP                  2016 Completed             [facility]



                                 00:00:00                         [facility]



                                                                  [facility]

 

           TDAP                  2016 Completed             [facility]



                                 00:00:00                         [facility]



                                                                  [facility]

 

           TDAP                  2016 Completed             [facility]



                                 00:00:00                         [facility]



                                                                  [facility]

 

           TDAP                  2016 Completed             [facility]



                                 00:00:00                         [facility]



                                                                  [facility]

 

           TDAP                  2016 Completed             [facility]



                                 00:00:00                         [facility]



                                                                  [facility]

 

           TDAP                  2016 Completed             [facility]



                                 00:00:00                         [facility]



                                                                  [facility]

 

           TDAP                  2016 Completed             [facility]



                                 00:00:00                         [facility]

 

           TDAP                  2016 Completed             [facility]



                                 00:00:00                         [facility]



                                                                  [facility]

 

           TDAP                  2016 Completed             [facility]



                                 00:00:00                         [facility]



                                                                  [facility]

 

           TDAP                  2016 Completed             [facility]



                                 00:00:00                         [facility]



                                                                  [facility]

 

           TDAP                  2016 Completed             [facility]



                                 00:00:00                         [facility]



                                                                  [facility]

 

           TDAP                  2016 Completed             [facility]



                                 00:00:00                         [facility]



                                                                  [facility]

 

           TDAP                  2016 Completed             [facility]



                                 00:00:00                         [facility]



                                                                  [facility]

 

           TDAP                  2016 Completed             [facility]



                                 00:00:00                         [facility]

 

           TDAP                  2016 Completed             [facility]



                                 00:00:00                         [facility]



                                                                  [facility]

 

           TDAP                  2016 Completed             [facility]



                                 00:00:00                         [facility]



                                                                  [facility]

 

           TDAP                  2016 Completed             [facility]



                                 00:00:00                         [facility]



                                                                  Branch

 

           TDAP                  2016 Completed             [facility]



                                 00:00:00                         [facility]



                                                                  Branch

 

           TDAP                  2016 Completed             [facility]



                                 00:00:00                         [facility]



                                                                  Branch

 

           TDAP                  2016 Completed             [facility]



                                 00:00:00                         [facility]



                                                                  Branch

 

           TDAP                  2016 Completed             [facility]



                                 00:00:00                         [facility]



                                                                  Branch

 

           TDAP                  2016 Completed             [facility]



                                 00:00:00                         [facility]



                                                                  Branch

 

           TDAP                  2016 Completed             [facility]



                                 00:00:00                         [facility]



                                                                  Branch

 

           TDAP                  2016 Completed             [facility]



                                 00:00:00                         [facility]

 

           HEPATITIS A            2011 Completed             [facility]



                                 00:00:00                         [facility]

 

           HPV                   2011 Completed             [facility]



                                 00:00:00                         [facility]

 

           Meningococcal            2011 Completed             [facility]



           Polysaccharide            00:00:00                         [facility]

[facility]



           (groups A, C, Y and                                             Branc

h



           W-135) conjugate                                             



           vaccine (MCV4P)                                             

 

           HEPATITIS A            2011 Completed             [facility]



                                 00:00:00                         [facility]

 

           HPV                   2011 Completed             [facility]



                                 00:00:00                         [facility]

 

           Meningococcal            2011 Completed             [facility]



           Polysaccharide            00:00:00                         [facility]

[facility]



           (groups A, C, Y and                                             Branc

h



           W-135) conjugate                                             



           vaccine (MCV4P)                                             

 

           HEPATITIS A            2011 Completed             [facility]



                                 00:00:00                         [facility]

 

           HPV                   2011 Completed             [facility]



                                 00:00:00                         [facility]

 

           Meningococcal            2011 Completed             [facility]



           Polysaccharide            00:00:00                         [facility]

[facility]



           (groups A, C, Y and                                             Branc

h



           W-135) conjugate                                             



           vaccine (MCV4P)                                             

 

           HEPATITIS A            2011 Completed             [facility]



                                 00:00:00                         [facility]

 

           HPV                   2011 Completed             [facility]



                                 00:00:00                         [facility]

 

           Meningococcal            2011 Completed             [facility]



           Polysaccharide            00:00:00                         [facility]

[facility]



           (groups A, C, Y and                                             Branc

h



           W-135) conjugate                                             



           vaccine (MCV4P)                                             

 

           HEPATITIS A            2011 Completed             [facility]



                                 00:00:00                         [facility]

 

           HPV                   2011 Completed             [facility]



                                 00:00:00                         [facility]

 

           Meningococcal            2011 Completed             [facility]



           Polysaccharide            00:00:00                         [facility]

[facility]



           (groups A, C, Y and                                             Branc

h



           W-135) conjugate                                             



           vaccine (MCV4P)                                             

 

           HEPATITIS A            2011 Completed             [facility]



                                 00:00:00                         [facility]

 

           HPV                   2011 Completed             [facility]



                                 00:00:00                         [facility]

 

           Meningococcal            2011 Completed             [facility]



           Polysaccharide            00:00:00                         [facility]

[facility]



           (groups A, C, Y and                                             Branc

h



           W-135) conjugate                                             



           vaccine (MCV4P)                                             

 

           HEPATITIS A            2011 Completed             [facility]



                                 00:00:00                         [facility]

 

           HPV                   2011 Completed             [facility]



                                 00:00:00                         [facility]

 

           Meningococcal            2011 Completed             [facility]



           Polysaccharide            00:00:00                         [facility]

[facility]



           (groups A, C, Y and                                             Branc

h



           W-135) conjugate                                             



           vaccine (MCV4P)                                             

 

           HEPATITIS A            2011 Completed             [facility]



                                 00:00:00                         [facility]

 

           HPV                   2011 Completed             [facility]



                                 00:00:00                         [facility]

 

           Meningococcal            2011 Completed             [facility]



           Polysaccharide            00:00:00                         [facility]

[facility]



           (groups A, C, Y and                                             Branc

h



           W-135) conjugate                                             



           vaccine (MCV4P)                                             

 

           HEPATITIS A            2011 Completed             [facility]



                                 00:00:00                         [facility]

 

           HPV                   2011 Completed             [facility]



                                 00:00:00                         [facility]

 

           Meningococcal            2011 Completed             [facility]



           Polysaccharide            00:00:00                         [facility]

[facility]



           (groups A, C, Y and                                             Branc

h



           W-135) conjugate                                             



           vaccine (MCV4P)                                             

 

           HEPATITIS A            2011 Completed             [facility]



                                 00:00:00                         [facility]

 

           HPV                   2011 Completed             [facility]



                                 00:00:00                         [facility]

 

           Meningococcal            2011 Completed             [facility]



           Polysaccharide            00:00:00                         [facility]

[facility]



           (groups A, C, Y and                                             Branc

h



           W-135) conjugate                                             



           vaccine (MCV4P)                                             

 

           HEPATITIS A            2011 Completed             [facility]



                                 00:00:00                         [facility]

 

           HPV                   2011 Completed             [facility]



                                 00:00:00                         [facility]

 

           Meningococcal            2011 Completed             [facility]



           Polysaccharide            00:00:00                         [facility]

[facility]



           (groups A, C, Y and                                             Branc

h



           W-135) conjugate                                             



           vaccine (MCV4P)                                             

 

           HEPATITIS A            2011 Completed             [facility]



                                 00:00:00                         [facility]

 

           HPV                   2011 Completed             [facility]



                                 00:00:00                         [facility]

 

           Meningococcal            2011 Completed             [facility]



           Polysaccharide            00:00:00                         [facility]

[facility]



           (groups A, C, Y and                                             Branc

h



           W-135) conjugate                                             



           vaccine (MCV4P)                                             

 

           HEPATITIS A            2011 Completed             [facility]



                                 00:00:00                         [facility]

 

           HPV                   2011 Completed             [facility]



                                 00:00:00                         [facility]

 

           Meningococcal            2011 Completed             [facility]



           Polysaccharide            00:00:00                         [facility]

[facility]



           (groups A, C, Y and                                             Branc

h



           W-135) conjugate                                             



           vaccine (MCV4P)                                             

 

           HEPATITIS A            2011 Completed             [facility]



                                 00:00:00                         [facility]

 

           HPV                   2011 Completed             [facility]



                                 00:00:00                         [facility]

 

           Meningococcal            2011 Completed             [facility]



           Polysaccharide            00:00:00                         [facility]

[facility]



           (groups A, C, Y and                                             Branc

h



           W-135) conjugate                                             



           vaccine (MCV4P)                                             

 

           HEPATITIS A            2011 Completed             [facility]



                                 00:00:00                         [facility]

 

           HPV                   2011 Completed             [facility]



                                 00:00:00                         [facility]

 

           Meningococcal            2011 Completed             [facility]



           Polysaccharide            00:00:00                         [facility]

[facility]



           (groups A, C, Y and                                             Branc

h



           W-135) conjugate                                             



           vaccine (MCV4P)                                             

 

           HEPATITIS A            2011 Completed             [facility]



                                 00:00:00                         [facility]

 

           HPV                   2011 Completed             [facility]



                                 00:00:00                         [facility]

 

           Meningococcal            2011 Completed             [facility]



           Polysaccharide            00:00:00                         [facility]

[facility]



           (groups A, C, Y and                                             Branc

h



           W-135) conjugate                                             



           vaccine (MCV4P)                                             

 

           HEPATITIS A            2011 Completed             [facility]



                                 00:00:00                         [facility]

 

           HPV                   2011 Completed             [facility]



                                 00:00:00                         [facility]

 

           Meningococcal            2011 Completed             [facility]



           Polysaccharide            00:00:00                         [facility]

[facility]



           (groups A, C, Y and                                             Branc

h



           W-135) conjugate                                             



           vaccine (MCV4P)                                             

 

           HEPATITIS A            2011 Completed             [facility]



                                 00:00:00                         [facility]

 

           HPV                   2011 Completed             [facility]



                                 00:00:00                         [facility]

 

           Meningococcal            2011 Completed             [facility]



           Polysaccharide            00:00:00                         [facility]

[facility]



           (groups A, C, Y and                                             Branc

h



           W-135) conjugate                                             



           vaccine (MCV4P)                                             

 

           HEPATITIS A            2011 Completed             [facility]



                                 00:00:00                         [facility]

 

           HPV                   2011 Completed             [facility]



                                 00:00:00                         [facility]

 

           Meningococcal            2011 Completed             [facility]



           Polysaccharide            00:00:00                         [facility]

[facility]



           (groups A, C, Y and                                             Branc

h



           W-135) conjugate                                             



           vaccine (MCV4P)                                             

 

           HPV                   2007 Completed             [facility]



                                 00:00:00                         [facility]

 

           Meningococcal            2007 Completed             [facility]



           Polysaccharide            00:00:00                         [facility]

[facility]



           (groups A, C, Y and                                             Branc

h



           W-135) conjugate                                             



           vaccine (MCV4P)                                             

 

           TDAP                  2007 Completed             [facility]



                                 00:00:00                         [facility]

 

           HPV                   2007 Completed             [facility]



                                 00:00:00                         [facility]

 

           Meningococcal            2007 Completed             [facility]



           Polysaccharide            00:00:00                         [facility]

[facility]



           (groups A, C, Y and                                             Branc

h



           W-135) conjugate                                             



           vaccine (MCV4P)                                             

 

           TDAP                  2007 Completed             [facility]



                                 00:00:00                         [facility]

 

           HPV                   2007 Completed             [facility]



                                 00:00:00                         [facility]

 

           Meningococcal            2007 Completed             [facility]



           Polysaccharide            00:00:00                         [facility]

[facility]



           (groups A, C, Y and                                             Branc

h



           W-135) conjugate                                             



           vaccine (MCV4P)                                             

 

           TDAP                  2007 Completed             [facility]



                                 00:00:00                         [facility]

 

           HPV                   2007 Completed             [facility]



                                 00:00:00                         [facility]

 

           Meningococcal            2007 Completed             [facility]



           Polysaccharide            00:00:00                         [facility]

[facility]



           (groups A, C, Y and                                             Branc

h



           W-135) conjugate                                             



           vaccine (MCV4P)                                             

 

           TDAP                  2007 Completed             [facility]



                                 00:00:00                         [facility]

 

           HPV                   2007 Completed             [facility]



                                 00:00:00                         [facility]

 

           Meningococcal            2007 Completed             [facility]



           Polysaccharide            00:00:00                         [facility]

[facility]



           (groups A, C, Y and                                             Branc

h



           W-135) conjugate                                             



           vaccine (MCV4P)                                             

 

           TDAP                  2007 Completed             [facility]



                                 00:00:00                         [facility]

 

           HPV                   2007 Completed             [facility]



                                 00:00:00                         [facility]

 

           Meningococcal            2007 Completed             [facility]



           Polysaccharide            00:00:00                         [facility]

[facility]



           (groups A, C, Y and                                             Branc

h



           W-135) conjugate                                             



           vaccine (MCV4P)                                             

 

           TDAP                  2007 Completed             [facility]



                                 00:00:00                         [facility]

 

           HPV                   2007 Completed             [facility]



                                 00:00:00                         [facility]

 

           Meningococcal            2007 Completed             [facility]



           Polysaccharide            00:00:00                         [facility]

[facility]



           (groups A, C, Y and                                             Branc

h



           W-135) conjugate                                             



           vaccine (MCV4P)                                             

 

           TDAP                  2007 Completed             [facility]



                                 00:00:00                         [facility]

 

           HPV                   2007 Completed             [facility]



                                 00:00:00                         [facility]

 

           Meningococcal            2007 Completed             [facility]



           Polysaccharide            00:00:00                         [facility]

[facility]



           (groups A, C, Y and                                             Branc

h



           W-135) conjugate                                             



           vaccine (MCV4P)                                             

 

           TDAP                  2007 Completed             [facility]



                                 00:00:00                         [facility]

 

           HPV                   2007 Completed             [facility]



                                 00:00:00                         [facility]

 

           Meningococcal            2007 Completed             [facility]



           Polysaccharide            00:00:00                         [facility]

[facility]



           (groups A, C, Y and                                             Branc

h



           W-135) conjugate                                             



           vaccine (MCV4P)                                             

 

           TDAP                  2007 Completed             [facility]



                                 00:00:00                         [facility]



                                                                  Branch

 

           HPV                   2007 Completed             [facility]



                                 00:00:00                         [facility]

 

           Meningococcal            2007 Completed             [facility] 

[facility]



           Polysaccharide            00:00:00                         [facility]

[facility]



           (groups A, C, Y and                                             Branc

h



           W-135) conjugate                                             



           vaccine (MCV4P)                                             

 

           TDAP                  2007 Completed             [facility]



                                 00:00:00                         [facility]

 

           HPV                   2007 Completed             [facility]



                                 00:00:00                         [facility]

 

           Meningococcal            2007 Completed             [facility]



           Polysaccharide            00:00:00                         [facility]

[facility]



           (groups A, C, Y and                                             Branc

h



           W-135) conjugate                                             



           vaccine (MCV4P)                                             

 

           TDAP                  2007 Completed             [facility]



                                 00:00:00                         [facility]

 

           HPV                   2007 Completed             [facility]



                                 00:00:00                         [facility]

 

           Meningococcal            2007 Completed             [facility]



           Polysaccharide            00:00:00                         [facility]

[facility]



           (groups A, C, Y and                                             Branc

h



           W-135) conjugate                                             



           vaccine (MCV4P)                                             

 

           TDAP                  2007 Completed             [facility]



                                 00:00:00                         [facility]

 

           HPV                   2007 Completed             [facility]



                                 00:00:00                         [facility]

 

           Meningococcal            2007 Completed             [facility]



           Polysaccharide            00:00:00                         [facility]

[facility]



           (groups A, C, Y and                                             Branc

h



           W-135) conjugate                                             



           vaccine (MCV4P)                                             

 

           TDAP                  2007 Completed             [facility]



                                 00:00:00                         [facility]

 

           HPV                   2007 Completed             [facility]



                                 00:00:00                         [facility]

 

           Meningococcal            2007 Completed             [facility]



           Polysaccharide            00:00:00                         [facility]

[facility]



           (groups A, C, Y and                                             Branc

h



           W-135) conjugate                                             



           vaccine (MCV4P)                                             

 

           TDAP                  2007 Completed             [facility]



                                 00:00:00                         [facility]



                                                                  Branch

 

           HPV                   2007 Completed             [facility]



                                 00:00:00                         [facility]

 

           Meningococcal            2007 Completed             [facility]



           Polysaccharide            00:00:00                         [facility]

[facility]



           (groups A, C, Y and                                             Branc

h



           W-135) conjugate                                             



           vaccine (MCV4P)                                             

 

           TDAP                  2007 Completed             [facility]



                                 00:00:00                         [facility]

 

           HPV                   2007 Completed             [facility]



                                 00:00:00                         [facility]

 

           Meningococcal            2007 Completed             [facility]



           Polysaccharide            00:00:00                         [facility]

[facility]



           (groups A, C, Y and                                             Branc

h



           W-135) conjugate                                             



           vaccine (MCV4P)                                             

 

           TDAP                  2007 Completed             [facility]



                                 00:00:00                         [facility]

 

           HPV                   2007 Completed             [facility]



                                 00:00:00                         [facility]

 

           Meningococcal            2007 Completed             [facility]



           Polysaccharide            00:00:00                         [facility]

[facility]



           (groups A, C, Y and                                             Branc

h



           W-135) conjugate                                             



           vaccine (MCV4P)                                             

 

           TDAP                  2007 Completed             [facility]



                                 00:00:00                         [facility]

 

           HPV                   2007 Completed             [facility]



                                 00:00:00                         [facility]

 

           Meningococcal            2007 Completed             [facility]



           Polysaccharide            00:00:00                         [facility]

[facility]



           (groups A, C, Y and                                             Branc

h



           W-135) conjugate                                             



           vaccine (MCV4P)                                             

 

           TDAP                  2007 Completed             [facility]



                                 00:00:00                         [facility]

 

           HPV                   2007 Completed             [facility]



                                 00:00:00                         [facility]

 

           Meningococcal            2007 Completed             [facility]



           Polysaccharide            00:00:00                         [facility]

[facility]



           (groups A, C, Y and                                             Branc

h



           W-135) conjugate                                             



           vaccine (MCV4P)                                             

 

           TDAP                  2007 Completed             [facility]



                                 00:00:00                         [facility]

 

           Varicella             2004 Completed             [facility]



           (varivax)(chicken            00:00:00                         [facility]



           pox)                                                   [facility]

 

           Varicella             2004 Completed             [facility]



           (varivax)(chicken            00:00:00                         [facility]



           pox)                                                   [facility]

 

           Varicella             2004 Completed             [facility]



           (varivax)(chicken            00:00:00                         [facility]



           pox)                                                   [facility]

 

           Varicella             2004 Completed             [facility]



           (varivax)(chicken            00:00:00                         [facility]



           pox)                                                   [facility]

 

           Varicella             2004 Completed             [facility]



           (varivax)(chicken            00:00:00                         [facility]



           pox)                                                   [facility]

 

           Varicella             2004 Completed             [facility]



           (varivax)(chicken            00:00:00                         [facility]



           pox)                                                   [facility]

 

           Varicella             2004 Completed             [facility]



           (varivax)(chicken            00:00:00                         [facility]



           pox)                                                   [facility]

 

           Varicella             2004 Completed             [facility]



           (varivax)(chicken            00:00:00                         [facility]



           pox)                                                   [facility]

 

           Varicella             2004 Completed             [facility]



           (varivax)(chicken            00:00:00                         [facility]



           pox)                                                   [facility]

 

           Varicella             2004 Completed             [facility]



           (varivax)(chicken            00:00:00                         [facility]



           pox)                                                   [facility]

 

           Varicella             2004 Completed             [facility]



           (varivax)(chicken            00:00:00                         [facility]



           pox)                                                   [facility]

 

           Varicella             2004 Completed             [facility]



           (varivax)(chicken            00:00:00                         [facility]



           pox)                                                   [facility]

 

           Varicella             2004 Completed             [facility]



           (varivax)(chicken            00:00:00                         [facility]



           pox)                                                   [facility]

 

           Varicella             2004 Completed             [facility]



           (varivax)(chicken            00:00:00                         [facility]



           pox)                                                   [facility]

 

           Varicella             2004 Completed             [facility]



           (varivax)(chicken            00:00:00                         [facility]



           pox)                                                   [facility]

 

           Varicella             2004 Completed             [facility]



           (varivax)(chicken            00:00:00                         [facility]



           pox)                                                   [facility]

 

           Varicella             2004 Completed             [facility]



           (varivax)(chicken            00:00:00                         [facility]



           pox)                                                   [facility]

 

           Varicella             2004 Completed             [facility]



           (varivax)(chicken            00:00:00                         [facility]



           pox)                                                   [facility]

 

           Varicella             2004 Completed             [facility]



           (varivax)(chicken            00:00:00                         [facility]



           pox)                                                   [facility]

 

           MMR                   1998 Completed             [facility]



                                 00:00:00                         [facility]

 

           MMR                   1998 Completed             [facility]



                                 00:00:00                         [facility]

 

           MMR                   1998 Completed             [facility]



                                 00:00:00                         [facility]

 

           MMR                   1998 Completed             [facility]



                                 00:00:00                         [facility]

 

           MMR                   1998 Completed             [facility]



                                 00:00:00                         [facility]

 

           MMR                   1998 Completed             [facility]



                                 00:00:00                         [facility]

 

           MMR                   1998 Completed             [facility]



                                 00:00:00                         [facility]                   1998 Completed             [facility]



                                 00:00:00                         [facility]                   1998 Completed             [facility]



                                 00:00:00                         [facility]                   1998 Completed             [facility]



                                 00:00:00                         [facility]                   1998 Completed             [facility]



                                 00:00:00                         [facility]                   1998 Completed             [facility]



                                 00:00:00                         [facility]                   1998 Completed             [facility]



                                 00:00:00                         [facility]                   1998 Completed             [facility]



                                 00:00:00                         [facility]                   1998 Completed             [facility]



                                 00:00:00                         [facility]                   1998 Completed             [facility]



                                 00:00:00                         [facility]                   1998 Completed             [facility]



                                 00:00:00                         [facility]                   1998 Completed             [facility]



                                 00:00:00                         [facility]                   1998 Completed             [facility]



                                 00:00:00                         [facility]

 

           Varicella             1997-12-15 Completed             [facility]



           (varivax)(chicken            00:00:00                         [facility]



           pox)                                                   [facility]

 

           Varicella             1997-12-15 Completed             [facility]



           (varivax)(chicken            00:00:00                         [facility]



           pox)                                                   [facility]

 

           Varicella             1997-12-15 Completed             [facility]



           (varivax)(chicken            00:00:00                         [facility]



           pox)                                                   [facility]

 

           Varicella             1997-12-15 Completed             [facility]



           (varivax)(chicken            00:00:00                         [facility]



           pox)                                                   [facility]

 

           Varicella             1997-12-15 Completed             [facility]



           (varivax)(chicken            00:00:00                         [facility]



           pox)                                                   [facility]

 

           Varicella             1997-12-15 Completed             [facility]



           (varivax)(chicken            00:00:00                         [facility]



           pox)                                                   [facility]

 

           Varicella             1997-12-15 Completed             [facility]



           (varivax)(chicken            00:00:00                         [facility]



           pox)                                                   [facility]

 

           Varicella             1997-12-15 Completed             [facility]



           (varivax)(chicken            00:00:00                         [facility]



           pox)                                                   [facility]

 

           Varicella             1997-12-15 Completed             [facility]



           (varivax)(chicken            00:00:00                         [facility]



           pox)                                                   [facility]

 

           Varicella             1997-12-15 Completed             [facility]



           (varivax)(chicken            00:00:00                         [facility]



           pox)                                                   [facility]

 

           Varicella             1997-12-15 Completed             [facility]



           (varivax)(chicken            00:00:00                         [facility]



           pox)                                                   [facility]

 

           Varicella             1997-12-15 Completed             [facility]



           (varivax)(chicken            00:00:00                         [facility]



           pox)                                                   [facility]

 

           Varicella             1997-12-15 Completed             [facility]



           (varivax)(chicken            00:00:00                         [facility]



           pox)                                                   [facility]

 

           Varicella             1997-12-15 Completed             [facility]



           (varivax)(chicken            00:00:00                         [facility]



           pox)                                                   [facility]

 

           Varicella             1997-12-15 Completed             [facility]



           (varivax)(chicken            00:00:00                         [facility]



           pox)                                                   [facility]

 

           Varicella             1997-12-15 Completed             [facility]



           (varivax)(chicken            00:00:00                         [facility]



           pox)                                                   [facility]

 

           Varicella             1997-12-15 Completed             [facility]



           (varivax)(chicken            00:00:00                         [facility]



           pox)                                                   [facility]

 

           Varicella             1997-12-15 Completed             [facility]



           (varivax)(chicken            00:00:00                         [facility]



           pox)                                                   [facility]

 

           Varicella             1997-12-15 Completed             [facility]



           (varivax)(chicken            00:00:00                         [facility]



           pox)                                                   [facility]

 

           IPV                   1997 Completed             [facility]



                                 00:00:00                         [facility]



                                                                  Branch

 

           DTAP                  1997 Completed             [facility]



                                 00:00:00                         [facility]



                                                                  Branch

 

           IPV                   1997 Completed             [facility]



                                 00:00:00                         [facility]



                                                                  [facility]

 

           DTAP                  1997 Completed             [facility]



                                 00:00:00                         [facility]



                                                                  [facility]

 

           IPV                   1997 Completed             [facility]



                                 00:00:00                         [facility]



                                                                  [facility]

 

           DTAP                  1997 Completed             [facility]



                                 00:00:00                         [facility]



                                                                  [facility]

 

           IPV                   1997 Completed             [facility]



                                 00:00:00                         [facility]



                                                                  [facility]

 

           DTAP                  1997 Completed             [facility]



                                 00:00:00                         [facility]



                                                                  [facility]

 

           IPV                   1997 Completed             [facility]



                                 00:00:00                         [facility]



                                                                  [facility]

 

           DTAP                  1997 Completed             [facility]



                                 00:00:00                         [facility]



                                                                  [facility]

 

           IPV                   1997 Completed             [facility]



                                 00:00:00                         [facility]



                                                                  [facility]

 

           DTAP                  1997 Completed             [facility]



                                 00:00:00                         [facility]



                                                                  [facility]

 

           IPV                   1997 Completed             [facility]



                                 00:00:00                         [facility]



                                                                  [facility]

 

           DTAP                  1997 Completed             [facility]



                                 00:00:00                         [facility]



                                                                  Branch

 

           IPV                   1997 Completed             [facility]



                                 00:00:00                         [facility]



                                                                  [facility]

 

           DTAP                  1997 Completed             [facility]



                                 00:00:00                         [facility]



                                                                  Branch

 

           IPV                   1997 Completed             [facility]



                                 00:00:00                         [facility]



                                                                  [facility]

 

           DTAP                  1997 Completed             [facility]



                                 00:00:00                         [facility]



                                                                  [facility]

 

           IPV                   1997 Completed             [facility]



                                 00:00:00                         [facility]



                                                                  [facility]

 

           DTAP                  1997 Completed             [facility]



                                 00:00:00                         [facility]



                                                                  [facility]

 

           IPV                   1997 Completed             [facility]



                                 00:00:00                         [facility]



                                                                  [facility]

 

           DTAP                  1997 Completed             [facility]



                                 00:00:00                         [facility]



                                                                  [facility]

 

           IPV                   1997 Completed             [facility]



                                 00:00:00                         [facility]



                                                                  [facility]

 

           DTAP                  1997 Completed             [facility]



                                 00:00:00                         [facility]



                                                                  [facility]

 

           IPV                   1997 Completed             [facility]



                                 00:00:00                         [facility]



                                                                  [facility]

 

           DTAP                  1997 Completed             [facility]



                                 00:00:00                         [facility]



                                                                  [facility]

 

           IPV                   1997 Completed             [facility]



                                 00:00:00                         [facility]



                                                                  [facility]

 

           DTAP                  1997 Completed             [facility]



                                 00:00:00                         [facility]



                                                                  [facility]

 

           IPV                   1997 Completed             [facility]



                                 00:00:00                         [facility]



                                                                  [facility]

 

           DTAP                  1997 Completed             [facility]



                                 00:00:00                         [facility]



                                                                  [facility]

 

           IPV                   1997 Completed             [facility]



                                 00:00:00                         [facility]



                                                                  [facility]

 

           DTAP                  1997 Completed             [facility]



                                 00:00:00                         [facility]



                                                                  [facility]

 

           IPV                   1997 Completed             [facility]



                                 00:00:00                         [facility]



                                                                  [facility]

 

           DTAP                  1997 Completed             [facility]



                                 00:00:00                         [facility]



                                                                  [facility]

 

           IPV                   1997 Completed             [facility]



                                 00:00:00                         [facility]



                                                                  [facility]

 

           DTAP                  1997 Completed             [facility]



                                 00:00:00                         [facility]



                                                                  [facility]

 

           IPV                   1997 Completed             [facility]



                                 00:00:00                         [facility]



                                                                  [facility]

 

           DTAP                  1997 Completed             [facility]



                                 00:00:00                         [facility]



                                                                  [facility]

 

           MMR                   1996 Completed             [facility]



                                 00:00:00                         [facility]



                                                                  [facility]

 

           MMR                   1996 Completed             [facility]



                                 00:00:00                         [facility]



                                                                  [facility]

 

           MMR                   1996 Completed             [facility]



                                 00:00:00                         [facility]



                                                                  [facility]

 

           MMR                   1996 Completed             [facility]



                                 00:00:00                         [facility]



                                                                  [facility]

 

           MMR                   1996 Completed             [facility]



                                 00:00:00                         [facility]



                                                                  [facility]

 

           MMR                   1996 Completed             [facility]



                                 00:00:00                         [facility]



                                                                  [facility]

 

           MMR                   1996 Completed             [facility]



                                 00:00:00                         [facility]



                                                                  [facility]

 

           MMR                   1996 Completed             [facility]



                                 00:00:00                         [facility]



                                                                  [facility]

 

           MMR                   1996 Completed             [facility]



                                 00[facility]00:00                         [facility]



                                                                  [facility]

 

           MMR                   1996 Completed             [facility]



                                 00:00:00                         [facility]



                                                                  [facility]

 

           MMR                   1996 Completed             [facility]



                                 00:00:00                         [facility]



                                                                  [facility]

 

           MMR                   1996 Completed             [facility]



                                 00:00:00                         [facility]



                                                                  [facility]

 

           MMR                   1996 Completed             [facility]



                                 00:00:00                         [facility]



                                                                  [facility]

 

           MMR                   1996 Completed             [facility]



                                 00:00:00                         [facility]



                                                                  [facility]

 

           MMR                   1996 Completed             [facility]



                                 00:00:00                         [facility]



                                                                  [facility]

 

           MMR                   1996 Completed             [facility]



                                 00:00:00                         [facility]



                                                                  [facility]

 

           MMR                   1996 Completed             [facility]



                                 00:00:00                         [facility]



                                                                  [facility]

 

           MMR                   1996 Completed             [facility]



                                 00:00:00                         [facility]



                                                                  [facility]

 

           MMR                   1996 Completed             [facility]



                                 00:00:00                         [facility]



                                                                  [facility]

 

           MMR                   1995 Completed             [facility]



                                 00:00:00                         [facility]



                                                                  [facility]

 

           IPV                   1995 Completed             [facility]



                                 00:00[facility]00                         [facility]



                                                                  [facility]

 

           HIB 3 Dose Schedule            1995 Completed             [facility]

[facility]



                                 00:00[facility]00                         [facility]



                                                                  [facility]

 

           DTAP                  1995 Completed             [facility]



                                 00:00:00                         [facility]



                                                                  [facility]

 

           MMR                   1995 Completed             [facility]



                                 00:00:00                         [facility]



                                                                  [facility]

 

           IPV                   1995 Completed             [facility]



                                 00:00:00                         [facility]



                                                                  [facility]

 

           HIB 3 Dose Schedule            1995 Completed             [facility]

[facility]



                                 00:00:00                         [facility]



                                                                  [facility]

 

           DTAP                  1995 Completed             [facility]



                                 00:00:00                         [facility]



                                                                  [facility]

 

           MMR                   1995 Completed             [facility]



                                 00:00[facility]00                         [facility]



                                                                  [facility]

 

           IPV                   1995 Completed             [facility]



                                 00:00:00                         [facility]



                                                                  [facility]

 

           HIB 3 Dose Schedule            1995 Completed             [facility]

[facility]



                                 00:00:00                         [facility]



                                                                  [facility]

 

           DTAP                  1995 Completed             [facility]



                                 00:00:00                         [facility]



                                                                  [facility]

 

           MMR                   1995 Completed             [facility]



                                 00:00:00                         [facility]



                                                                  [facility]

 

           IPV                   1995 Completed             [facility]



                                 00:00:00                         [facility]



                                                                  [facility]

 

           HIB 3 Dose Schedule            1995 Completed             [facility]

[facility]



                                 00:00:00                         [facility]



                                                                  [facility]

 

           DTAP                  1995 Completed             [facility]



                                 00:00:00                         [facility]



                                                                  [facility]

 

           MMR                   1995 Completed             [facility]



                                 00:00:00                         [facility]



                                                                  [facility]

 

           IPV                   1995 Completed             [facility]



                                 00:00:00                         [facility]



                                                                  [facility]

 

           HIB 3 Dose Schedule            1995 Completed             [facility]

[facility]



                                 00:00:00                         [facility]



                                                                  [facility]

 

           DTAP                  1995 Completed             [facility]



                                 00:00:00                         [facility]



                                                                  [facility]

 

           MMR                   1995 Completed             [facility]



                                 00[facility]00:00                         [facility]



                                                                  [facility]

 

           IPV                   1995 Completed             [facility]



                                 00:00:00                         [facility]



                                                                  [facility]

 

           HIB 3 Dose Schedule            1995 Completed             [facility]

[facility]



                                 00:00:00                         [facility]



                                                                  [facility]

 

           DTAP                  1995 Completed             [facility]



                                 00:00:00                         [facility]



                                                                  [facility]

 

           MMR                   1995 Completed             [facility]



                                 00:00:00                         [facility]



                                                                  [facility]

 

           IPV                   1995 Completed             [facility]



                                 00:00:00                         [facility]



                                                                  [facility]

 

           HIB 3 Dose Schedule            1995 Completed             [facility]

[facility]



                                 00:00:00                         [facility]



                                                                  [facility]

 

           DTAP                  1995 Completed             [facility]



                                 00:00:00                         [facility]



                                                                  [facility]

 

           MMR                   1995 Completed             [facility]



                                 00:00:00                         [facility]



                                                                  [facility]

 

           IPV                   1995 Completed             [facility]



                                 00:00:00                         [facility]



                                                                  [facility]

 

           HIB 3 Dose Schedule            1995 Completed             [facility]

[facility]



                                 00:00:00                         [facility]



                                                                  [facility]

 

           DTAP                  1995 Completed             [facility]



                                 00:00:00                         [facility]



                                                                  [facility]

 

           MMR                   1995 Completed             [facility]



                                 00:00:00                         [facility]



                                                                  [facility]

 

           IPV                   1995 Completed             [facility]



                                 00:00:00                         [facility]



                                                                  [facility]

 

           HIB 3 Dose Schedule            1995 Completed             [facility]

[facility]



                                 00:00:00                         [facility]



                                                                  [facility]

 

           DTAP                  1995 Completed             [facility]



                                 00[facility]00:00                         [facility]



                                                                  [facility]

 

           MMR                   1995 Completed             [facility]



                                 00:00:00                         [facility]



                                                                  [facility]

 

           IPV                   1995 Completed             [facility]



                                 00:00:00                         [facility]



                                                                  [facility]

 

           HIB 3 Dose Schedule            1995 Completed             [facility]

[facility]



                                 00:00:00                         [facility]



                                                                  [facility]

 

           DTAP                  1995 Completed             [facility]



                                 00:00:00                         [facility]



                                                                  [facility]

 

           MMR                   1995 Completed             [facility]



                                 00:00:00                         [facility]



                                                                  [facility]

 

           IPV                   1995 Completed             [facility]



                                 00:00:00                         [facility]



                                                                  [facility]

 

           HIB 3 Dose Schedule            1995 Completed             [facility]

[facility]



                                 00:00:00                         [facility]



                                                                  [facility]

 

           DTAP                  1995 Completed             [facility]



                                 00[facility]00:00                         [facility]



                                                                  [facility]

 

           MMR                   1995 Completed             [facility]



                                 00:00:00                         [facility]



                                                                  [facility]

 

           IPV                   1995 Completed             [facility]



                                 00:00:00                         [facility]



                                                                  [facility]

 

           HIB 3 Dose Schedule            1995 Completed             [facility]

[facility]



                                 00:00:00                         [facility]



                                                                  [facility]

 

           DTAP                  1995 Completed             [facility]



                                 00:00:00                         [facility]



                                                                  [facility]

 

           MMR                   1995 Completed             [facility]



                                 00:00:00                         [facility]



                                                                  [facility]

 

           IPV                   1995 Completed             [facility]



                                 00:00:00                         [facility]



                                                                  [facility]

 

           HIB 3 Dose Schedule            1995 Completed             [facility]

[facility]



                                 00:00:00                         [facility]



                                                                  [facility]

 

           DTAP                  1995 Completed             [facility]



                                 00:00:00                         [facility]



                                                                  [facility]

 

           MMR                   1995 Completed             [facility]



                                 00:00:00                         [facility]



                                                                  [facility]

 

           IPV                   1995 Completed             [facility]



                                 00:00:00                         [facility]



                                                                  [facility]

 

           HIB 3 Dose Schedule            1995 Completed             [facility]

[facility]



                                 00:00:00                         [facility]



                                                                  [facility]

 

           DTAP                  1995 Completed             [facility]



                                 00:00:00                         [facility]



                                                                  [facility]

 

           MMR                   1995 Completed             [facility]



                                 00:00:00                         [facility]



                                                                  [facility]

 

           IPV                   1995 Completed             [facility]



                                 00:00:00                         [facility]



                                                                  [facility]

 

           HIB 3 Dose Schedule            1995 Completed             [facility]

[facility]



                                 00:00:00                         [facility]



                                                                  [facility]

 

           DTAP                  1995 Completed             [facility]



                                 00:00:00                         [facility]



                                                                  [facility]

 

           MMR                   1995 Completed             [facility]



                                 00:00:00                         [facility]



                                                                  [facility]

 

           IPV                   1995 Completed             [facility]



                                 00[facility]00[facility]00                         [facility]



                                                                  [facility]

 

           HIB 3 Dose Schedule            1995 Completed             [facility]



                                 00[facility]00[facility]00                         [facility]



                                                                  [facility]

 

           DTAP                  1995 Completed             [facility]



                                 00[facility]00[facility]00                         [facility]



                                                                  [facility]

 

           MMR                   1995 Completed             [facility]



                                 00[facility]00[facility]00                         [facility]



                                                                  [facility]

 

           IPV                   1995 Completed             [facility]



                                 00:00[facility]00                         [facility]



                                                                  [facility]

 

           HIB 3 Dose Schedule            1995 Completed             [facility]



                                 00:00:00                         [facility]



                                                                  [facility]

 

           DTAP                  1995 Completed             [facility]



                                 00:00[facility]00                         [facility]



                                                                  [facility]

 

           MMR                   1995 Completed             [facility]



                                 00:00[facility]00                         [facility]



                                                                  [facility]

 

           IPV                   1995 Completed             [facility]



                                 00:00[facility]00                         [facility]



                                                                  [facility]

 

           HIB 3 Dose Schedule            1995 Completed             [facility]

[facility]



                                 00:00:00                         [facility]



                                                                  [facility]

 

           DTAP                  1995 Completed             [facility]



                                 00:00:00                         [facility]



                                                                  [facility]

 

           MMR                   1995 Completed             [facility]



                                 00:00:00                         [facility]



                                                                  [facility]

 

           IPV                   1995 Completed             [facility]



                                 00:00[facility]00                         [facility]

 

           HIB 3 Dose Schedule            1995 Completed             [facility]



                                 00:00:00                         [facility]



                                                                  [facility]

 

           DTAP                  1995 Completed             [facility]



                                 00:00:00                         [facility]

 

           Hep B, Adol or Pedi            1994 Completed             [facility]

[facility]



           Dosage                00:00:00                         [facility]



                                                                  [facility]

 

           IPV                   1994 Completed             [facility]



                                 00:00:00                         [facility]

 

           DTAP                  1994 Completed             [facility]



                                 00:00:00                         [facility]



                                                                  [facility]

 

           Hep B, Adol or Pedi            1994 Completed             [facility]

[facility]



           Dosage                00:00:00                         [facility]



                                                                  [facility]

 

           IPV                   1994 Completed             [facility]



                                 00:00:00                         [facility]



                                                                  [facility]

 

           DTAP                  1994 Completed             [facility]



                                 00:00:00                         [facility]



                                                                  [facility]

 

           Hep B, Adol or Pedi            1994 Completed             [facility]

[facility]



           Dosage                00:00:00                         [facility]



                                                                  [facility]

 

           IPV                   1994 Completed             [facility]



                                 00:00:00                         [facility]



                                                                  [facility]

 

           DTAP                  1994 Completed             [facility]



                                 00:00:00                         [facility]



                                                                  [facility]

 

           Hep B, Adol or Pedi            1994 Completed             [facility]

[facility]



           Dosage                00:00:00                         [facility]



                                                                  [facility]

 

           IPV                   1994 Completed             [facility]



                                 00:00:00                         [facility]



                                                                  [facility]

 

           DTAP                  1994 Completed             [facility]



                                 00:00:00                         [facility]



                                                                  [facility]

 

           Hep B, Adol or Pedi            1994 Completed             [facility]

[facility]



           Dosage                00:00:00                         [facility]



                                                                  [facility]

 

           IPV                   1994 Completed             [facility]



                                 00:00:00                         [facility]



                                                                  [facility]

 

           DTAP                  1994 Completed             [facility]



                                 00:00:00                         [facility]



                                                                  [facility]

 

           Hep B, Adol or Pedi            1994 Completed             [facility]

[facility]



           Dosage                00:00:00                         [facility]



                                                                  [facility]

 

           IPV                   1994 Completed             [facility]



                                 00:00:00                         [facility]

 

           DTAP                  1994 Completed             [facility]



                                 00:00:00                         [facility]

 

           Hep B, Adol or Pedi            1994 Completed             [facility]

[facility]



           Dosage                00:00:00                         [facility]

 

           IPV                   1994 Completed             [facility]



                                 00:00:00                         [facility]

 

           DTAP                  1994 Completed             [facility]



                                 00:00:00                         [facility]

 

           Hep B, Adol or Pedi            1994 Completed             [facility]

[facility]



           Dosage                00:00:00                         [facility]

 

           IPV                   1994 Completed             [facility]



                                 00:00:00                         [facility]

 

           DTAP                  1994 Completed             [facility]



                                 00:00:00                         [facility]

 

           Hep B, Adol or Pedi            1994 Completed             [facility]

[facility]



           Dosage                00:00:00                         [facility]

 

           IPV                   1994 Completed             [facility]



                                 00:00:00                         [facility]

 

           DTAP                  1994 Completed             [facility]



                                 00:00:00                         [facility]

 

           Hep B, Adol or Pedi            1994 Completed             [facility]

[facility]



           Dosage                00:00:00                         [facility]

 

           IPV                   1994 Completed             [facility]



                                 00:00:00                         [facility]

 

           DTAP                  1994 Completed             [facility]



                                 00:00:00                         [facility]



                                                                  [facility]

 

           Hep B, Adol or Pedi            1994 Completed             [facility]

[facility]



           Dosage                00:00:00                         [facility]

 

           IPV                   1994 Completed             [facility]



                                 00:00:00                         [facility]

 

           DTAP                  1994 Completed             [facility]



                                 00:00:00                         [facility]

 

           Hep B, Adol or Pedi            1994 Completed             [facility]

[facility]



           Dosage                00:00:00                         [facility]

 

           IPV                   1994 Completed             [facility]



                                 00:00:00                         [facility]



                                                                  [facility]

 

           DTAP                  1994 Completed             [facility]



                                 00:00:00                         [facility]



                                                                  [facility]

 

           Hep B, Adol or Pedi            1994 Completed             [facility]

[facility]



           Dosage                00:00:00                         [facility]

 

           IPV                   1994 Completed             [facility]



                                 00:00:00                         [facility]

 

           DTAP                  1994 Completed             [facility]



                                 00:00:00                         [facility]



                                                                  [facility]

 

           Hep B, Adol or Pedi            1994 Completed             [facility]

[facility]



           Dosage                00:00:00                         [facility]



                                                                  [facility]

 

           IPV                   1994 Completed             [facility]



                                 00:00:00                         [facility]



                                                                  [facility]

 

           DTAP                  1994 Completed             [facility]



                                 00:00:00                         [facility]



                                                                  [facility]

 

           Hep B, Adol or Pedi            1994 Completed             [facility]

[facility]



           Dosage                00:00:00                         [facility]



                                                                  [facility]

 

           IPV                   1994 Completed             [facility]



                                 00:00:00                         [facility]



                                                                  [facility]

 

           DTAP                  1994 Completed             [facility]



                                 00:00:00                         [facility]



                                                                  [facility]

 

           Hep B, Adol or Pedi            1994 Completed             [facility]

[facility]



           Dosage                00:00:00                         [facility]



                                                                  [facility]

 

           IPV                   1994 Completed             [facility]



                                 00:00:00                         [facility]



                                                                  [facility]

 

           DTAP                  1994 Completed             [facility]



                                 00:00:00                         [facility]



                                                                  [facility]

 

           Hep B, Adol or Pedi            1994 Completed             [facility]

[facility]



           Dosage                00:00:00                         [facility]



                                                                  [facility]

 

           IPV                   1994 Completed             [facility]



                                 00:00:00                         [facility]



                                                                  [facility]

 

           DTAP                  1994 Completed             [facility]



                                 00:00:00                         [facility]



                                                                  [facility]

 

           Hep B, Adol or Pedi            1994 Completed             [facility]

[facility]



           Dosage                00:00:00                         [facility]



                                                                  [facility]

 

           IPV                   1994 Completed             [facility]



                                 00:00:00                         [facility]



                                                                  [facility]

 

           DTAP                  1994 Completed             [facility]



                                 00:00:00                         [facility]



                                                                  [facility]

 

           Hep B, Adol or Pedi            1994 Completed             [facility]

[facility]



           Dosage                00:00:00                         [facility]



                                                                  [facility]

 

           IPV                   1994 Completed             [facility]



                                 00:00:00                         [facility]



                                                                  [facility]

 

           DTAP                  1994 Completed             [facility]



                                 00:00:00                         [facility]



                                                                  [facility]

 

           Hep B, Adol or Pedi            1993 Completed             [facility]

[facility]



           Dosage                00:00:00                         [facility]



                                                                  [facility]

 

           IPV                   1993 Completed             [facility]



                                 00:00:00                         [facility]



                                                                  [facility]

 

           HIB 3 Dose Schedule            1993 Completed             [facility]

[facility]



                                 00:00:00                         [facility]



                                                                  [facility]

 

           DTAP                  1993 Completed             [facility]



                                 00:00:00                         [facility]



                                                                  [facility]

 

           Hep B, Adol or Pedi            1993 Completed             [facility]

[facility]



           Dosage                00:00:00                         [facility]



                                                                  [facility]

 

           IPV                   1993 Completed             [facility]



                                 00:00:00                         [facility]



                                                                  [facility]

 

           HIB 3 Dose Schedule            1993 Completed             [facility]

[facility]



                                 00:00:00                         [facility]



                                                                  [facility]

 

           DTAP                  1993 Completed             [facility]



                                 00:00:00                         [facility]



                                                                  [facility]

 

           Hep B, Adol or Pedi            1993 Completed             [facility]

[facility]



           Dosage                00:00:00                         [facility]



                                                                  [facility]

 

           IPV                   1993 Completed             [facility]



                                 00:00:00                         [facility]



                                                                  [facility]

 

           HIB 3 Dose Schedule            1993 Completed             [facility]

[facility]



                                 00:00:00                         [facility]



                                                                  [facility]

 

           DTAP                  1993 Completed             [facility]



                                 00:00:00                         [facility]



                                                                  [facility]

 

           Hep B, Adol or Pedi            1993 Completed             [facility]

[facility]



           Dosage                00:00:00                         [facility]



                                                                  [facility]

 

           IPV                   1993 Completed             [facility]



                                 00:00:00                         [facility]



                                                                  [facility]

 

           HIB 3 Dose Schedule            1993 Completed             [facility]

[facility]



                                 00:00:00                         [facility]



                                                                  [facility]

 

           DTAP                  1993 Completed             [facility]



                                 00:00:00                         [facility]



                                                                  [facility]

 

           Hep B, Adol or Pedi            1993 Completed             [facility]

[facility]



           Dosage                00:00:00                         [facility]



                                                                  [facility]

 

           IPV                   1993 Completed             [facility]



                                 00:00:00                         [facility]



                                                                  [facility]

 

           HIB 3 Dose Schedule            1993 Completed             [facility]

[facility]



                                 00:00:00                         [facility]



                                                                  [facility]

 

           DTAP                  1993 Completed             [facility]



                                 00:00:00                         [facility]



                                                                  [facility]

 

           Hep B, Adol or Pedi            1993 Completed             [facility]

[facility]



           Dosage                00:00:00                         [facility]



                                                                  [facility]

 

           IPV                   1993 Completed             [facility]



                                 00:00:00                         [facility]



                                                                  [facility]

 

           HIB 3 Dose Schedule            1993 Completed             [facility]

[facility]



                                 00:00:00                         [facility]



                                                                  [facility]

 

           DTAP                  1993 Completed             [facility]



                                 00:00:00                         [facility]



                                                                  [facility]

 

           Hep B, Adol or Pedi            1993 Completed             [facility]

[facility]



           Dosage                00:00:00                         [facility]



                                                                  [facility]

 

           IPV                   1993 Completed             [facility]



                                 00:00:00                         [facility]



                                                                  [facility]

 

           HIB 3 Dose Schedule            1993 Completed             [facility]

[facility]



                                 00:00:00                         [facility]



                                                                  [facility]

 

           DTAP                  1993 Completed             [facility]



                                 00:00:00                         [facility]



                                                                  [facility]

 

           Hep B, Adol or Pedi            1993 Completed             [facility]

[facility]



           Dosage                00:00:00                         [facility]



                                                                  [facility]

 

           IPV                   1993 Completed             [facility]



                                 00[facility]00                         [facility] [facility]



                                                                  [facility]

 

           HIB 3 Dose Schedule            1993 Completed             [facility]

[facility]



                                 00:00:00                         [facility]



                                                                  [facility]

 

           DTAP                  1993 Completed             [facility]



                                 00[facility]00[facility]00                         [facility]



                                                                  [facility]

 

           Hep B, Adol or Pedi            1993 Completed             [facility]

[facility]



           Dosage                00:00:00                         [facility]



                                                                  [facility]

 

           IPV                   1993 Completed             [facility]



                                 00:00[facility]00                         [facility]



                                                                  [facility]

 

           HIB 3 Dose Schedule            1993 Completed             [facility]

[facility]



                                 00:00:00                         [facility]



                                                                  [facility]

 

           DTAP                  1993 Completed             [facility]



                                 00:00:00                         [facility]



                                                                  [facility]

 

           Hep B, Adol or Pedi            1993 Completed             [facility]

[facility]



           Dosage                00:00:00                         [facility]



                                                                  [facility]

 

           IPV                   1993 Completed             [facility]



                                 00:00:00                         [facility]



                                                                  [facility]

 

           HIB 3 Dose Schedule            1993 Completed             [facility]

[facility]



                                 00:00:00                         [facility]



                                                                  [facility]

 

           DTAP                  1993 Completed             [facility]



                                 00:00:00                         [facility]



                                                                  [facility]

 

           Hep B, Adol or Pedi            1993 Completed             [facility]

[facility]



           Dosage                00:00:00                         [facility]



                                                                  [facility]

 

           IPV                   1993 Completed             [facility]



                                 00:00:00                         [facility]



                                                                  [facility]

 

           HIB 3 Dose Schedule            1993 Completed             [facility]

[facility]



                                 00:00:00                         [facility]



                                                                  [facility]

 

           DTAP                  1993 Completed             [facility]



                                 00:00:00                         [facility]



                                                                  [facility]

 

           Hep B, Adol or Pedi            1993 Completed             [facility]

[facility] of



           Dosage                00:00:00                         [facility]



                                                                  [facility]

 

           IPV                   1993 Completed             [facility]



                                 00:00:00                         [facility]



                                                                  [facility]

 

           HIB 3 Dose Schedule            1993 Completed             [facility]

[facility]



                                 00:00:00                         [facility]



                                                                  [facility]

 

           DTAP                  1993 Completed             [facility]



                                 00:00:00                         [facility]



                                                                  [facility]

 

           Hep B, Adol or Pedi            1993 Completed             [facility]

[facility] of



           Dosage                00:00:00                         [facility]



                                                                  [facility]

 

           IPV                   1993 Completed             [facility]



                                 00:00:00                         [facility]



                                                                  [facility]

 

           HIB 3 Dose Schedule            1993 Completed             [facility]

[facility]



                                 00:00:00                         [facility]



                                                                  [facility]

 

           DTAP                  1993 Completed             [facility]



                                 00:00:00                         [facility]



                                                                  [facility]

 

           Hep B, Adol or Pedi            1993 Completed             [facility]

[facility] of



           Dosage                00:00:00                         [facility]



                                                                  [facility]

 

           IPV                   1993 Completed             [facility]



                                 00:00:00                         [facility]



                                                                  [facility]

 

           HIB 3 Dose Schedule            1993 Completed             [facility]

[facility]



                                 00:00:00                         [facility]



                                                                  [facility]

 

           DTAP                  1993 Completed             [facility]



                                 00:00:00                         [facility]



                                                                  [facility]

 

           Hep B, Adol or Pedi            1993 Completed             [facility]

[facility] of



           Dosage                00:00:00                         [facility]



                                                                  [facility]

 

           IPV                   1993 Completed             [facility]



                                 00:00:00                         [facility]



                                                                  [facility]

 

           HIB 3 Dose Schedule            1993 Completed             [facility]

[facility]



                                 00:00:00                         [facility]



                                                                  [facility]

 

           DTAP                  1993 Completed             [facility]



                                 00:00:00                         [facility]



                                                                  [facility]

 

           Hep B, Adol or Pedi            1993 Completed             [facility]

[facility] of



           Dosage                00:00:00                         [facility]



                                                                  [facility]

 

           IPV                   1993 Completed             [facility]



                                 00:00:00                         [facility]



                                                                  [facility]

 

           HIB 3 Dose Schedule            1993 Completed             [facility]

[facility]



                                 00:00:00                         [facility]



                                                                  [facility]

 

           DTAP                  1993 Completed             [facility]



                                 00:00:00                         [facility]



                                                                  [facility]

 

           Hep B, Adol or Pedi            1993 Completed             [facility]

[facility] of



           Dosage                00:00:00                         [facility]



                                                                  [facility]

 

           IPV                   1993 Completed             [facility]



                                 00:00:00                         [facility]



                                                                  [facility]

 

           HIB 3 Dose Schedule            1993 Completed             [facility]

[facility]



                                 00:00:00                         [facility]



                                                                  [facility]

 

           DTAP                  1993 Completed             [facility]



                                 00:00:00                         [facility]



                                                                  [facility]

 

           Hep B, Adol or Pedi            1993 Completed             [facility]

[facility] of



           Dosage                00:00:00                         [facility]



                                                                  [facility]

 

           IPV                   1993 Completed             [facility]



                                 00:00:00                         [facility]



                                                                  [facility]

 

           HIB 3 Dose Schedule            1993 Completed             [facility]

[facility]



                                 00:00:00                         [facility]



                                                                  [facility]

 

           DTAP                  1993 Completed             [facility]



                                 00:00:00                         [facility]



                                                                  [facility]

 

           Hep B, Adol or Pedi            1993 Completed             [facility]

[facility] of



           Dosage                00:00:00                         [facility]



                                                                  [facility]

 

           IPV                   1993 Completed             [facility]



                                 00:00:00                         [facility]



                                                                  [facility]

 

           HIB 3 Dose Schedule            1993 Completed             [facility]

[facility]



                                 00:00:00                         [facility]



                                                                  [facility]

 

           DTAP                  1993 Completed             [facility]



                                 00:00:00                         [facility]



                                                                  [facility]

 

           Hep B, Adol or Pedi            1993 Completed             [facility]

[facility]



           Dosage                00:00:00                         [facility]



                                                                  [facility]

 

           DTAP                  1993 Completed             [facility]



                                 00:00:00                         [facility]



                                                                  [facility]

 

           Hep B, Adol or Pedi            1993 Completed             [facility]

[facility] of



           Dosage                00:00:00                         [facility]



                                                                  [facility]

 

           DTAP                  1993 Completed             [facility]



                                 00:00:00                         [facility]



                                                                  [facility]

 

           Hep B, Adol or Pedi            1993 Completed             [facility]

[facility] of



           Dosage                00:00:00                         [facility]



                                                                  [facility]

 

           DTAP                  1993 Completed             [facility]



                                 00:00:00                         [facility]



                                                                  [facility]

 

           Hep B, Adol or Pedi            1993 Completed             [facility]

[facility] of



           Dosage                00:00:00                         [facility]



                                                                  [facility]

 

           DTAP                  1993 Completed             [facility]



                                 00:00:00                         [facility]



                                                                  [facility]

 

           Hep B, Adol or Pedi            1993 Completed             [facility]

[facility] of



           Dosage                00:00:00                         [facility]

 

           DTAP                  1993 Completed             [facility]



                                 00:00:00                         [facility]



                                                                  [facility]

 

           Hep B, Adol or Pedi            1993 Completed             [facility]

[facility] of



           Dosage                00:00:00                         [facility]



                                                                  [facility]

 

           DTAP                  1993 Completed             [facility]



                                 00:00:00                         [facility]



                                                                  [facility]

 

           Hep B, Adol or Pedi            1993 Completed             [facility]

[facility] of



           Dosage                00:00:00                         [facility]



                                                                  [facility]

 

           DTAP                  1993 Completed             [facility]



                                 00:00:00                         [facility]



                                                                  [facility]

 

           Hep B, Adol or Pedi            1993 Completed             [facility]

[facility] of



           Dosage                00:00:00                         [facility]

 

           DTAP                  1993 Completed             [facility]



                                 00:00:00                         [facility]



                                                                  [facility]

 

           Hep B, Adol or Pedi            1993 Completed             [facility]

[facility] of



           Dosage                00:00:00                         [facility]



                                                                  [facility]

 

           DTAP                  1993 Completed             [facility]



                                 00:00:00                         [facility]



                                                                  [facility]

 

           Hep B, Adol or Pedi            1993 Completed             [facility]

[facility] of



           Dosage                00:00:00                         [facility]



                                                                  [facility]

 

           DTAP                  1993 Completed             [facility]



                                 00:00:00                         [facility]



                                                                  [facility]

 

           Hep B, Adol or Pedi            1993 Completed             [facility]

[facility] of



           Dosage                00:00:00                         [facility]



                                                                  [facility]

 

           DTAP                  1993 Completed             [facility]



                                 00:00:00                         [facility]



                                                                  [facility]

 

           Hep B, Adol or Pedi            1993 Completed             [facility]

[facility] of



           Dosage                00:00:00                         [facility]



                                                                  [facility]

 

           DTAP                  1993 Completed             [facility]



                                 00:00:00                         [facility]



                                                                  [facility]

 

           Hep B, Adol or Pedi            1993 Completed             [facility]

rs[facility] of



           Dosage                00:00:00                         [facility]

 

           DTAP                  1993 Completed             [facility]



                                 00:00:00                         [facility]



                                                                  [facility]

 

           Hep B, Adol or Pedi            1993 Completed             [facility]

[facility] of



           Dosage                00:00:00                         [facility]

 

           DTAP                  1993 Completed             [facility]



                                 00:00:00                         [facility]



                                                                  [facility]

 

           Hep B, Adol or Pedi            1993 Completed             [facility]

[facility] of



           Dosage                00:00:00                         [facility]

 

           DTAP                  1993 Completed             [facility]



                                 00:00:00                         [facility]



                                                                  [facility]

 

           Hep B, Adol or Pedi            1993 Completed             [facility]

[facility]



           [facility]                [facility]             [facility]



                                 00:00:00                         [facility]



                                                                  [facility]

 

           Hep B, Adol or Pedi            1993 Completed             [facility]

rs[facility] of



           Dosage                00:00:00                         [facility]

 

           DTAP                  1993 Completed             [facility]



                                 00:00:00                         [facility]



                                                                  [facility]

 

           Hep B, Adol or Pedi            1993 Completed             [facility]

[facility]



           [facility]                [facility]:00:00                         [facility]             [facility]



                                 00:00:00                         [facility]



                                                                  [facility]

 

           Hep B, Adol or Pedi            1993 Completed             [facility]

[facility] of



           Dosage                00:00:00                         [facility]                  1993 Completed             [facility]



                                 00:00:00                         [facility]







Vital Signs







             Vital Name   Observation Time Observation Value Comments     Source

 

             Systolic blood 2023 16:10:00 106 mm[Hg]                [facility]

[facility]



             pressure                                            [facility]

 

             Diastolic blood 2023 16:10:00 75 mm[Hg]                 [facility]

[facility] of



             pressure                                            [facility]

 

             Heart rate   2023 16:10:00 93 /min                   [facility]

 

             Body temperature 2023 16:10:00 36.78 Anjali                 [facility]

 

             Body height  2023 16:10:00 160 cm                    [facility]

 

             Body weight  2023 16:10:00 64.592 kg                 [facility]

[facility]



                                                                 [facility]



                                                                 [facility]

 

             BMI          2023 16:10:00 25.23 kg/m2               [facility]

[facility]



                                                                 [facility]



                                                                 [facility]

 

             Systolic blood 2023 16:35:00 110 mm[Hg]                [facility]

[facility] of



             pressure                                            [facility]



                                                                 [facility]

 

             Diastolic blood 2023 16:35:00 75 mm[Hg]                 [facility]

[facility] of



             pressure                                            [facility]



                                                                 [facility]

 

             Heart rate   2023 16:35:00 75 /min                   [facility]

[facility]



                                                                 [facility]



                                                                 [facility]

 

             Body temperature 2023 16:35:00 37.06 Anjali                 [facility]iv

ers[facility] of



                                                                 [facility]



                                                                 [facility]

 

             Body height  2023 16:35:00 160 cm                    [facility]

[facility]



                                                                 [facility]



                                                                 [facility]

 

             Body weight  2023 16:35:00 64.955 kg                 [facility]

[facility]



                                                                 [facility]



                                                                 [facility]

 

             BMI          2023 16:35:00 25.37 kg/m2               [facility]

[facility]



                                                                 [facility]



                                                                 [facility]

 

             Systolic blood 2023 19:30:00 109 mm[Hg]                [facility]

[facility] of



             pressure                                            [facility]



                                                                 [facility]

 

             Diastolic blood 2023 19:30:00 73 mm[Hg]                 [facility]

[facility] of



             pressure                                            [facility]



                                                                 [facility]

 

             Heart rate   2023 19:30:00 82 /min                   [facility]

[facility]



                                                                 [facility]



                                                                 [facility]

 

             Body temperature 2023 19:30:00 36.67 Anjali                 [facility]

ers[facility] of



                                                                 [facility]



                                                                 [facility]

 

             Body height  2023 19:30:00 160 cm                    [facility]i

ty of



                                                                 [facility]



                                                                 Branch

 

             Body weight  2023 19:30:00 65.772 kg                 Universi

ty of



                                                                 [facility]



                                                                 Branch

 

             BMI          2023 19:30:00 25.69 kg/m2               Universi

ty of



                                                                 [facility]



                                                                 Branch

 

             Systolic blood 2022-12-10 16:11:00 108 mm[Hg]                Univer

sity of



             pressure                                            [facility]



                                                                 Branch

 

             Diastolic blood 2022-12-10 16:11:00 71 mm[Hg]                 Unive

rsity of



             pressure                                            [facility]



                                                                 Branch

 

             Heart rate   2022-12-10 16:11:00 72 /min                   Universi

ty of



                                                                 [facility]



                                                                 Branch

 

             Body temperature 2022-12-10 16:11:00 36.94 Anjali                 Univ

ersity of



                                                                 [facility]



                                                                 Branch

 

             Respiratory rate 2022-12-10 16:11:00 16 /min                   Univ

ersity of



                                                                 [facility]



                                                                 Branch

 

             Body height  2022-12-10 16:11:00 160 cm                    Universi

ty of



                                                                 [facility]



                                                                 Branch

 

             Body weight  2022-12-10 16:11:00 64.411 kg                 Universi

ty of



                                                                 [facility]



                                                                 Branch

 

             BMI          2022-12-10 16:11:00 25.15 kg/m2               Universi

ty of



                                                                 [facility]



                                                                 Branch

 

             Oxygen saturation in 2022-12-10 16:11:00 99 /min                   

[facility]



             Arterial blood by                                        [facility]

mihaela



             Pulse oximetry                                        Branch

 

             Systolic blood 2022 15:32:00 106 mm[Hg]                Univer

sity of



             pressure                                            [facility]



                                                                 Branch

 

             Diastolic blood 2022 15:32:00 75 mm[Hg]                 Unive

rsity of



             pressure                                            [facility]



                                                                 Branch

 

             Heart rate   2022 15:32:00 88 /min                   Universi

ty of



                                                                 [facility]



                                                                 Branch

 

             Body temperature 2022 15:32:00 36.94 Anjali                 Univ

ersity of



                                                                 [facility]



                                                                 Branch

 

             Respiratory rate 2022 15:32:00 17 /min                   Univ

ersity of



                                                                 [facility]



                                                                 Branch

 

             Body height  2022 15:32:00 160 cm                    Universi

ty of



                                                                 [facility]



                                                                 [facility]anch

 

             Body weight  2022 15:32:00 64.411 kg                 Universi

ty of



                                                                 [facility]



                                                                 Branch

 

             BMI          2022 15:32:00 25.15 kg/m2               Universi

ty of



                                                                 [facility]



                                                                 Branch

 

             Systolic blood 2022 19:45:00 96 mm[Hg]                 Univer

sity of



             pressure                                            [facility]



                                                                 Branch

 

             Diastolic blood 2022 19:45:00 63 mm[Hg]                 Unive

rsity of



             pressure                                            [facility]



                                                                 Branch

 

             Heart rate   2022 19:45:00 73 /min                   Universi

ty of



                                                                 [facility]



                                                                 [facility]anch

 

             Body temperature 2022 19:45:00 37.22 Anjali                 Univ

ersity of



                                                                 [facility]



                                                                 Branch

 

             Body height  2022 19:45:00 160 cm                    Universi

ty of



                                                                 [facility]



                                                                 [facility]anch

 

             Body weight  2022 19:45:00 62.506 kg                 Universi

ty of



                                                                 [facility]



                                                                 [facility]anch

 

             BMI          2022 19:45:00 24.41 kg/m2               Universi

ty of



                                                                 [facility]



                                                                 Branch

 

             Oxygen saturation in 2022 19:45:00 100 /min                  

[facility]



             Arterial blood by                                        [facility]

mihaela



             Pulse oximetry                                        Branch

 

             Systolic blood 2022 18:28:00 117 mm[Hg]                Univer

sity of



             pressure                                            [facility]



                                                                 Branch

 

             Diastolic blood 2022 18:28:00 81 mm[Hg]                 Unive

rsity of



             pressure                                            [facility]



                                                                 Branch

 

             Heart rate   2022 18:28:00 94 /min                   Universi

ty of



                                                                 [facility]

 

             Body temperature 2022 18:28:00 36.5 Anjali                  Univ

ersity of



                                                                 [facility]



                                                                 Branch

 

             Respiratory rate 2022 18:28:00 18 /min                   Univ

ersity of



                                                                 [facility]



                                                                 Branch

 

             Body height  2022 18:28:00 157.5 cm                  Universi

ty of



                                                                 [facility]

 

             Body weight  2022 18:28:00 62.234 kg                 Universi

ty of



                                                                 [facility]

 

             BMI          2022 18:28:00 25.09 kg/m2               Universi

ty of



                                                                 [facility]

 

             Systolic blood 2022 21:11:00 118 mm[Hg]                Univer

sity of



             pressure                                            [facility]



                                                                 Branch

 

             Diastolic blood 2022 21:11:00 78 mm[Hg]                 Unive

rsity of



             pressure                                            [facility]



                                                                 Branch

 

             Heart rate   2022 21:11:00 88 /min                   Universi

ty of



                                                                 [facility]



                                                                 [place]

 

             Body temperature 2022 21:11:00 36.78 Anjali                 Univ

ersity of



                                                                 [facility]



                                                                 [place]

 

             Body height  2022 21:11:00 160 cm                    Universi

ty of



                                                                 [facility]



                                                                 Branch

 

             Body weight  2022 21:11:00 62.596 kg                 Universi

ty of



                                                                 [facility]



                                                                 Branch

 

             BMI          2022 21:11:00 24.45 kg/m2               Universi

ty of



                                                                 [facility]



                                                                 Branch

 

             Oxygen saturation in 2022 21:11:00 99 /min                   

[facility]



             Arterial blood by                                        [facility]

mihaela



             Pulse oximetry                                        Branch







Procedures







                Procedure       Date / Time     Performing Clinician Source



                                Performed                       

 

                POCT URINALYSIS W/O 2023 00:00:00 [facility]   [facility] [place]



                SPECIFIC GRAVITY                                 [facility]

 

                SCANNED LAB RESULTS 2023 05:01:00 [facility]

 

                [facility] URINALYSIS W/O 2023 00:00:00 Emmanuelle Weeks   [facility]

 

                US OB TRANSVAGINAL 2023 20:44:12 Emmanuelle Weeks   [facility] PATIENT FINANCIAL 2023 19:14:10 Doctor Unassigned, No

 [facility]



                POLICY                          [facility]

 

                POCT PREGNANCY TEST 2023 00:00:00 Emmanuelle Weeks   [facility]

 

                POCT URINALYSIS W/O 2023 00:00:00 Emmanuelle Weeks   [facility]

 

                BI US GUIDED CORE 2023 16:26:00 José Miguel Weeks        [facility]



                BREAST BIOPSY RIGHT                                 [facility]

ch

 

                BI ULTRASOUND BREAST 2023 16:39:45 Emmanuelle Weeks   [facility]



                COMPLETE RIGHT                                  [facility]

 

                CONSENT/REFUSAL FOR 2023 14:38:50 Doctor Unassigned, No [facility]



                DIAGNOSIS AND TREATMENT                 [facility]

 

                ASSIGNMENT OF BENEFITS 2023 14:38:21 Doctor Unassigned, No

 [facility]

 

                ASSIGNMENT OF BENEFITS 2022 19:12:05 Doctor Unassigned, No

 [facility]







Encounters







        Start   End     Encounter Admission Attending Care    Care    Encounter 

Source



        Date/Time Date/Time Type    Type    Clinicians Facility Department ID   

   

 

        2023 Outpatient P               [facility]    [id number]

[id number] Univers



        13:30:00 13:30:00                                                 ity [facility]

 

        2023 Technician         2, Ad[facility] Lab [facility]    1.2.840.114 

[id number] Univers



        10:15:00 10:30:00 Visit           [facility] 350.1.13.10    

     ity [facility] [id number]         [place]

[place]



                                                PROFESSIO [phone]         Me

dical



                                                [address]                 

 

        2023 Outpatient R       EMMANUELLE WEEKS [facility]    [id number]

[id number] Univers



        10:15:00 10:15:00                                                 ity of



                                                                        [facility]

 

        2023 Outpatient R       EMMANUELLE WEEKS [facility]    [id number]

[id number] Univers



        11:15:00 11:45:50                                                 ity of



                                                                        [facility]

 

        2023 Routine         Emmanuelle Weeks [facility]    [id number]

[id number] Univers



        11:15:00 11:45:50 Prenatal         Cam     [place] 350.1.13.10         

ity of



                        Visit                   [place] [id number]         [place]

[place]



                                                PROFESSIO [phone]         Me

dical



                                                NAL     134             [facility]                 

 

        2023 Telephone         Janice Weeksen [facility]    1.2.840.114 [id number]

[id number] Univers



        00:00:00 00:00:00                 Cam     [place] 350.1.13.10         i

ty of



                                                [place] [id number]         [place]

[place]



                                                PROFESSIO [phone]         Me

dical



                                                NAL     134             [facility]                 

 

        2023 Technician         Ralph, [facility]    1.2.8

40.114 [id number] 

Univers



        12:00:00 12:15:00 Visit           Janice Weeksmanpreet RIOS 350.1.13.10    

     ity of



                                                [place] [id number]         [place]

[place]



                                                PROFESSIO [phone]         Me

dical



                                                NAL     353             [facility]                 

 

        2023 Outpatient R       EMMANUELLE WEEKS [facility]    [id number]

[id number] Univers



        11:00:00 12:05:54                                                 ity of



                                                                        [facility]

 

        2023 Routine         Janice Weeksen [facility]    [id number]

[id number] Univers



        11:00:00 12:05:54 Prenatal         Cam     [place] 350.1.13.10         

ity of



                        Visit                   [place] [id number]         [place]

[place]



                                                PROFESSIO [phone]         Me

dical



                                                NAL     134             [facility]                 

 

        2023 Orders          Doctor  LEA    1.2.840.114 [id number]

[id number] Univers



        00:00:00 00:00:00 Only            Unassigned, AMAN   350.1.13.10       

  ity of



                                        [facility] [id number]         Andres

as



                                                        [phone]         [address]

 

        2023-04-10 2023-04-10 Telephone         Emmanuelle Weeks [facility]    1.2.840.114 [id number]

[id number] Univers



        00:00:00 00:00:00                 Cam     [place] 350.1.13.10         i

ty of



                                                [place] [id number]         [place]

[place]



                                                PROFESSIO [phone]         [address]                 

 

        2023 Outpatient R       EMMANUELLE WEEKS [facility]    [id number]

[id number] Univers



        14:30:00 15:41:24                                                 ity of



                                                                        [facility]

 

        2023 Initial         Emmanuelle Weeks [facility]    [id number]

[id number] Univers



        14:30:00 15:41:24 Prenatal         Cam     [place] 350.1.13.10         

ity of



                        Visit                   [place] [id number]         [place]

s



                                                PROFESSIO [phone]         [address]                 

 

        2023 Orders          Doctor  LEA    1.2.840.114 [id number]

[id number] Univers



        00:00:00 00:00:00 Only            Unassigned, AMAN   350.1.13.10       

  ity of



                                        [facility] [id number]         Andres

as



                                                        [phone]         [address]

 

        2023 Telephone         Emmanuelle Weeks [facility]    1.2.840.114 [id number]

[id number] Univers



        00:00:00 00:00:00                 Cam     [place] 350.1.13.10         i

ty of



                                                [place] [id number]         [place]

s



                                                PROFESSIO [phone]         [address]                 

 

        2023 Telephone         Emmanuelle Weeks [facility]    1.2.840.114 [id number]

[id number] Univers



        00:00:00 00:00:00                 Cam     [place] 350.1.13.10         i

ty of



                                                [place] [id number]         [place]

[place]



                                                PROFESSIO [phone]         [address]                 

 

        2023 Outpatient R       RADIOLOGY [facility]    [id number]

[id number] Univers



        09:10:48 23:59:00                                                 ity of



                                                                        [facility]

 

        2023 Hospital         Radiology [facility]    1.2.840.114 [id number]

[id number] Univers



        09:10:48 23:59:00 Encounter                 [place] 350.1.13.10        

 ity [facility]



                                                [facility] [id number]         [place]

[place]



                                                CAMPUS  [phone]         Medi

mihaela



                                                        [address]             [place]

 

        2023 Case            Emmanuelle Weeks [facility]    [id number] [id number]

[id number] Univers



        00:00:00 00:00:00 Management         Cam     [place] 350.1.13.10       

  ity of



                                                [facility] [id number]         [place]

[place]



                                                PROFESSIO [phone]         Me

dical



                                                NAL     134             [facility]                 

 

        2023 Outpatient R       EMMANUELLE WEEKS [facility]    [id number]

[id number] Univers



        08:39:24 23:59:00                                                 ity of



                                                                        [facility]

 

        2023 [facility]         Emmanuelle Weeks [facility]    1.2.840.114 [id number]

[id number] Univers



        08:30:00 23:59:00 Encounter         Cam     GABRIEL 350.1.13.10        

 ity of



                                                [facility] [id number]         [place]

[place]



                                                CAMPUS  [phone]         [address]

 

        2022 Telephone         Michelle [facility]    1.2.840.114 [id number]

[id number] Univers



        00:00:00 00:00:00                 Rachael RIOS 350.1.13.10         i

ty of



                                                [facility] [id number]         [place]

[place]



                                                PROFESSIO [phone]         Me

dical



                                                NAL     134             [facility]                 

 

        2022-12-10 2022-12-10 Outpatient R       MICHELLE [facility]    [id number]

[id number] Univers



        10:20:00 10:27:57                 RACHAEL                           ity of



                                                                        [facility]

 

        2022-12-10 2022-12-10 Urgent          Rachael Martinez [facility]    1.2.840.11

[id number] Univers



        10:20:00 10:27:57 Care            Unknown, Attending HEALTH  350.1.13.10

         ity [facility]



                                                [facility] [id number]         Andres

as



                                                ANDRES?BLEA [phone]         [facility]

[facility]    370             Branch



                                                MEDICAL                 



                                                OFFICE                  



                                                BUILDING                 

 

        2022 Outpatient R       EMMANUELLE WEEKS [facility]    [id number]

[id number] Univers



        09:30:00 09:53:47                                                 ity of



                                                                        [facility]

 

        2022 Office          LesEmmanuelle [facility]    1.2.907.146 [id number]

[id number] Univers



        09:30:00 09:53:47 Visit           Abimael RIOS 350.1.13.10         i

ty of



                                                [facility] 4.2.7.2.686         [place]

[place]



                                                PROFESSIO [phone]         [facility]     134             [facility]                 

 

        2022 Office          [facility]    1.2.840.114 [id number]

[id number] Univers



        13:00:00 13:30:00 Visit           Joyce A [facility]  350.1.13.10         i

ty of



                                                [facility] [id number]         Andres

as



                                                ANDRES?BLEA [phone]         [facility]

[facility]    044             [facility][note 3[facility]                 

 

        2022 Outpatient R       VASYL [facility]    [id number]

[id number] Univers



        13:00:00 13:00:00                 JOYCE                          ity of



                                                                        [facility]

 

        2022 Orders          Doctor [facility]    1.2.840.114 [id number]

[id number] Univers



        00:00:00 00:00:00 Only            Unassigned, AMAN   350.1.13.10       

  ity of



                                        [place] [facility] [id number]         Andres

as



                                                        [phone]         [address]

 

        2022 Outpatient R       MICHELLE [facility]    [id number]

[id number] Univers



        13:30:00 13:54:50                 RACHAEL                           itnelson of



                                                                        [facility]

 

        2022 Office          Michelle [facility]    [id number]

1214 Univers



        13:30:00 13:54:50 Visit           Rachael RIOS 350.1.13.10         i

ty of



                                                [place] [id number]         [place]

[place]



                                                PROFESSIO [phone]         [address]                 

 

        2022 Office          Alberto [facility]    1.2.840.114 [id number]

52 Univers



        16:30:00 16:30:30 Visit           [facility]  350.1.13.10         it

y of



                                                [place] [id number]         Andres

as



                                                ANDRES?BLEA [phone]         Me

dical



                                                KNEY    [address]             [place]



                                                MEDICAL                 



                                                OFFICE                  



                                                [facility]                 

 

        2022 Outpatient R       ALBERTO [facility]    [id number]

084 Univers



        16:30:00 16:30:30                 ETIENNE                            ity [facility]

 

        2022 Outpatient R       [facility]    [id number]

084 Univers



        16:30:00 16:30:00                 ETIENNE                            ity [facility]

 

        2022 Outpatient R               [facility]    [id number]

396 Univers



        09:00:00 09:00:00                                                 ity [facility]

 

        2022 Outpatient                 [facility]     [id number]

80 [address]



        00:00:00 00:00:00                                                 [facility]

 

        2022 Outpatient                 [facility]     [id number]

92 [address]



        00:00:00 00:00:00                                                 Health

 

        2022 Outpatient R       EMMANUELLE WEEKS [facility]    [id number]

[id number] Univers



        09:00:00 09:11:45                                                 ity [facility]

 

        2022 Nurse           Nurse, [facility]    

1.2.840.114 [id number]

                                        Univers



        09:00:00 09:11:45 Visit           Emmanuelle Weeks 350.1.13.10    

     ity [facility]



                                                [facility] [id number]         [place]

[place]



                                                PROFESS[place] [phone]         Me

jun



                                                NAL     134             [facility]                 

 

        2022 Outpatient R       EMMANUELLE WEEKS [facility]    [id number]

[id number] Univers



        13:00:00 14:10:33                                                 ity [facility]

 

        2022 Office          Emmanuelle Weeks [facility]    [id number]

7693 Univers



        13:00:00 14:10:33 Visit           Abimael RIOS 350.1.13.10         i

ty of



                                                [facility] [id number]         [place]

[place]



                                                PROFESSIO [phone]         Me

dic[address]                 

 

        2022 Outpatient R       EMMANUELLE WEEKS [facility]    [id number]

[id number] Univers



        13:00:00 14:10:33                                                 ity of



                                                                        [facility]

 

        2022 Orders          Doctor  LEA    1.2.840.114 [id number]

11 Univers



        00:00:00 00:00:00 Only            Unassigned, AMAN   350.1.13.10       

  ity of



                                        [facility] 4.2.7.2.686         Andres

as



                                                        [phone]         [address]

 

        2022 Telephone         Emmanuelle Weeks [facility]    1.2.840.114 [id number]

[id number] Univers



        00:00:00 00:00:00                 Cam     GABRIEL 350.1.13.10         i

ty of



                                                [place] 4.2.7.2.686         [place]

[place]



                                                PROFESSIO [phone]         [address]                 

 

        2022 Outpatient R       EMMANUELLE WEEKS [facility]    [id number]

[id number] Univers



        09:00:00 09:00:00                                                 ity of



                                                                        [facility]

 

        [facility]    1.2.840.114 [id number]

145 Univers



        00:00:00 00:00:00                 [facility]  350.1.13.10        

 ity of



                                                [place] [id number]         Andres

as



                                                ANDRES?BLEA [phone]         [address]



                                                MEDICAL                 



                                                OFFICE                  



                                                BUILDING                 

 

        2021 Office          Emmanuelle Weeks [facility]    1.2.840.114 

[id number] Univers



        09:31:17 10:17:22 Visit           Rachael Martinez 350.1.13.10  

       ity of



                                                [place] 4.2.7.2.686         [place]

[place]



                                                PROFESSIO [phone]         Me

dic[address]                 

 

        2021 Outpatient R       MICHELLE [facility]    [id number]

[id number] Univers



        09:30:00 10:17:22                 RACHAEL                           ity [facility]

 

        2021 Outpatient R       MICHELLE [facility]    [id number]

[id number] Univers



        09:30:00 10:17:22                 RACHAEL martinez [facility]

 

        2021 Outpatient R       MARIELALAUREN [facility]    [id number]

[id number] Univers



        09:30:00 09:30:00                 RACHAEL martinez [facility]

 

        2021 Outpatient R       MICHELLE [facility]    [id number]

[id number] Univers



        09:30:00 09:30:00                 [facility]

 

        2021 Orders          Doctor  LEA    1.2.840.114 [id number]

80 Univers



        00:00:00 00:00:00 Only            Unassigned, AMAN   350.1.13.10       

  ity of



                                        [facility] 4.2.7.2.686         Andres

as



                                                        [phone]         [address]

 

        2021 Outpatient R       CONRAD [facility]    [id number]

8621 Univers



        10:30:00 10:30:00                 WONDIFUL                         ity o

f



                                                                        [facility]

 

        2021 Outpatient R       CONRAD [facility]    [id number]

8621 Univers



        10:30:00 10:30:00                 WONDIFUL                         ity o

f



                                                                        [facility]

 

        2021-11-10 2021-11-10 Telephone         [facility]    1.2.840.114 [id number]

[id number] Univers



        00:00:00 00:00:00                 Rachael RIOS 350.1.13.10         i

ty of



                                                [place] 4.2.7.2.686         [place]

[place]



                                                PROFESSIO [phone]         Me

dical



                                                NAL     [address] [place] [address]                 

 

        2021 Marquez Weeks Emmanuelle [facility]    [id number]

[id number] Univers



        00:00:00 00:00:00 Management         Abimael RIOS 350.1.13.10       

  ity of



                                                [facility] 4.2.7.2.686         [place]

[place]



                                                PROFESSIO [phone]         Me

dical



                                                NAL     [address] [place] [address]                 

 

        2021 Outpatient R       EMMANUELLE WEEKS [facility]    [id number]

[id number] Univers



        14:00:00 15:36:59                                                 ity [facility]

 

        2021 Office          [facility]    1.2.173.613 [id number]

[id number] Univers



        13:49:34 15:36:59 Visit           Cam     ANGLETON 350.1.13.10         i

ty of



                                                [facility] 4.2.7.2.686         [place]

[place]



                                                PROFESSIO [phone]         Me

dical



                                                [address]                 

 

        2021 Outpatient R       EMMANUELLE WEEKS [facility]    [id number]

[id number] Univers



        14:00:00 14:00:00                                                 ity of



                                                                        [facility]

 

        2021 Telephone         [facility]    1.2.840.114 [id number]

[id number] Univers



        00:00:00 00:00:00                 Cam     ANGLETON 350.1.13.10         i

ty of



                                                [place] 4.2.7.2.686         [place]

[place]



                                                PROFESSIO [phone]         [facility]



                                                [address]                 

 

        2021 Out[facility] R       [facility]    [id number]

631 Univers



        15:00:00 15:00:00                 [facility]ENDIL                          [facility]

 

        [facility] Outpatient R       [facility]    [id number]

631 Univers



        15:00:00 15:00:00                 SENDIL                          [facility]

 

        2021-10-12 2021-10-12 Office          [facility]    1.2.840.114 [id number]

993 Univers



        14:44:33 15:57:45 Visit           [facility]  350.1.13.10        

 ity of



                                                [place] 4.2.7.2.686         Andres

as



                                                Andres?Blea [phone]         Me

dical



                                                [address]                 



                                                Office                  



                                                [facility]                 

 

        2021-10-12 2021-10-12 Outpatient R       [facility]    [id number]

7968 Univers



        15:00:00 15:00:00                 WONDIFUL                         ity o

f



                                                                        [facility]

 

        2021 Outpatient R               [facility]    [id number]

496 Univers



        15:00:00 15:00:00                                                 ity [facility]

 

        2021 Outpatient R               [facility]    [id number]

597 Univers



        10:00:00 10:00:00                                                 ity of



                                                                        [facility]

 

        2021 Outpatient R               [facility]    [id number]

580 Univers



        10:30:00 10:30:00                                                 ity [facility]

 

        2021 Outpatient R               [facility]    [id number]

016 Univers



        09:00:00 09:00:00                                                 ity [facility]

 

        2021 Outpatient R               [facility]    [id number]

921 Univers



        15:00:00 15:00:00                                                 ity [facility]

 

        2021 Outpatient R               [facility]    [id number]

203 Univers



        10:00:00 10:00:00                                                 ity [facility]

 

        2021 Outpatient R               [facility]    [id number]

699 Univers



        09:00:00 09:00:00                                                 ity [facility]

 

        2021 Outpatient R               [facility]    [id number]

664 Univers



        08:00:00 08:00:00                                                 ity [facility]

 

        2021 Outpatient R               [facility]    [id number]

644 Univers



        09:00:00 09:00:00                                                 ity [facility]

 

        2021 Outpatient R       EMMANUELLE WEEKS [facility]    [id number]

[id number] Univers



        13:30:00 13:30:00                                                 ity [facility]

 

        2020 Case            [facility]    [id number]

[id number] 



        00:00:00 00:00:00 Management         Rachael Rios 350.1.13.10       

  



                                                [place] 4.2.7.2.686         



                                                Professio [phone]         



                                                [address]                 

 

        2020 Telephone         [facility]    1.2.840.114 [id number]

[id number] 



        00:00:00 00:00:00                 Rachael Rios 350.1.13.10         



                                                Su 4.2.7.2.686         



                                                N[place] [phone]         



                                                [address]                 

 

        2020 Outpatient R       MICHELLE [facility]    [id number]

[id number] Univers



        08:00:00 08:00:00                 RACHAEL                           [facility]

 

        2020 Outpatient R       EMMANUELLE WEEKS [facility]    [id number]

[id number] Univers



        10:00:00 10:00:00                                                 [facility]

 

        2020 Outpatient R               [facility]    [id number]

588 Univers



        10:00:00 10:00:00                                                 [facility]

 

        2020 Outpatient R       CONRAD [facility]    [id number]

6793 Univers



        16:00:00 16:00:00                 WONDIFUL                         ity o

f



                                                                        [facility]

 

        2020 Outpatient R       CONRAD [facility]    [id number]

2692 Univers



        16:15:00 16:15:00                 WONDIFUL                         ity o

f



                                                                        [facility]

 

        2020 Outpatient R       EMMANUELLE WEEKS [facility]    [id number]

[id number] Univers



        10:00:00 10:00:00                                                 [facility]

 

        2020 Outpatient R       CONRAD [facility]    [id number]

6818 Univers



        11:15:00 11:15:00                 WONDIFUL                         ity o

f



                                                                        [facility]







Results







           Test Description Test Time  Test Comments Results    Result Comments 

Source









                    POCT URINALYSIS W/O SPECIFIC GRAVITY 2023 16:08:00 









                      Test Item  Value      Reference Range Interpretation Comme

nts









             POCT PH U (test code = 3254) n/a          5-8                      

 

 

             POCT U LEUK EST (test code = 3263) n/a          Negative - Negative

              

 

             POCT U NIT (test code = 3262) n/a          Negative - Negative     

         

 

             POCT U PROT (test code = 3259) Negative     Negative - Negative    

          

 

             POCT U GLU (test code = 3256) Normal       Negative - Negative     

         

 

             POCT U KETONE (test code = 3258) n/a          Negative - Negative  

            

 

             POCT U BLD (test code = 3257) n/a          Negative - Negative     

         



[facility] [id number]
 16:32:00





             Test Item    Value        Reference Range Interpretation Comments

 

             POCT PH U (test code = 3254) n/a          5-8                      

 

 

             POCT U LEUK EST (test code = 3263) n/a          Negative - Negative

              

 

             POCT U NIT (test code = 3262) n/a          Negative - Negative     

         

 

             POCT U PROT (test code = 3259) Trace        Negative - Negative    

          

 

             POCT U GLU (test code = 3256) Normal       Negative - Negative     

         

 

             POCT U KETONE (test code = 3258) n/a          Negative - Negative  

            

 

             POCT U BLD (test code = 3257) n/a          Negative - Negative     

         



[facility] [id number] 19:31:00





             Test Item    Value        Reference Range Interpretation Comments

 

             POCT PREG (test code = 1605) Positive                              

 

 

             On board controls acceptable with C Yes                            

        



             Line (test code = 3574)                                        

 

             POCT PREG LOT # (test code = 3575)                                 

       

 

             POCT PREG TEST  DATE (test                                   

     



             code = 3576)                                        



[facility]/O SPECIFIC [id number]
 19:30:00





             Test Item    Value        Reference Range Interpretation Comments

 

             POCT PH U (test code = 3254) n/a          5-8                      

 

 

             POCT U LEUK EST (test code = n/a          Negative - Negative      

        



             3263)                                               

 

             POCT U NIT (test code = 3262) n/a          Negative - Negative     

         

 

             POCT U PROT (test code = 3259) Negative     Negative - Negative    

          

 

             POCT U GLU (test code = 3256) Normal       Negative - Negative     

         

 

             POCT U KETONE (test code = 3258) n/a          Negative - Negative  

            

 

             POCT U BLD (test code = 3257) n/a          Negative - Negative     

         



[facility]

## 2023-05-24 VITALS — TEMPERATURE: 98.2 F

## 2023-05-24 VITALS — DIASTOLIC BLOOD PRESSURE: 60 MMHG | OXYGEN SATURATION: 98 % | SYSTOLIC BLOOD PRESSURE: 97 MMHG

## 2023-05-24 NOTE — ER
Nurse's Notes                                                                                     

 [facility]                                                                 

Name: Nickie Keenan                                                                             

Age: 27 yrs                                                                                       

Sex: Female                                                                                       

: 1995                                                                                   

MRN: [id number]                                                                                   

Arrival Date: 2023                                                                          

Time: 22:22                                                                                       

Account#: [id number]                                                                            

Bed 14                                                                                            

Private MD:                                                                                       

Diagnosis: Headache;Pelvic and perineal pain                                                      

                                                                                                  

Presentation:                                                                                     

                                                                                             

22:30 Chief complaint: Patient states: "I've had a headache for 3 days now and started having mb9 

      lower cramping in my pelvis that started today. I haven't felt my baby kick in the past     

      few days".                                                                                  

22:30 Coronavirus screen: Vaccine status: Patient reports being unvaccinated. Ebola Screen:   mb9 

      No symptoms or risks identified at this time. Initial Sepsis Screen: Does the patient       

      meet any 2 criteria? No. Patient's initial sepsis screen is negative. Does the patient      

      have a suspected source of infection? No. Patient's initial sepsis screen is negative.      

      Risk Assessment: Do you want to hurt yourself or someone else? Patient reports no           

      desire to harm self or others. Onset of symptoms was May 23, 2023.                          

22:30 Acuity: NEO 3                                                                           mb9 

22:59 Method Of Arrival: Ambulatory                                                           mb9 

                                                                                                  

Triage Assessment:                                                                                

23:01 General: Appears in no apparent distress. Behavior is cooperative. Pain: Complains of   mb9 

      pain in abdomen Pain radiates to pelvis Pain currently is 4 out of 10 on a pain scale.      

      Quality of pain is described as throbbing, Pain began suddenly. Neuro: [place]             

      Agitation-Sedation Scale (RASS): 0 - Alert and Calm Level of Consciousness is awake,        

      alert, obeys commands, Oriented to person, place, time, situation, Appropriate for age.     

      Cardiovascular: Patient's skin is warm and dry. Respiratory: Airway is patent               

      Respiratory effort is even, unlabored, Respiratory pattern is regular, symmetrical. GI:     

      Bowel sounds present X 4 quads. Abd is soft and non tender X 4 quads. : Reports           

      cramping, in bilateral upper quadrant(s) lower quadrant(s) Denies vaginal bleeding.         

      Derm: Skin is pink, warm \T\ dry. Musculoskeletal: Range of motion: intact in all           

      extremities.                                                                                

                                                                                                  

OB/GYN:                                                                                           

23:03  2, Full Term 1, Premature 0,  0, Living 1                               mb9 

                                                                                                  

Historical:                                                                                       

- Allergies:                                                                                      

23:01 No Known Allergies;                                                                     mb9 

- Home Meds:                                                                                      

23:01 None [Active];                                                                          mb9 

- PMHx:                                                                                           

23:01 None;                                                                                   mb9 

- PSHx:                                                                                           

23:01 None;                                                                                   mb9 

                                                                                                  

- Immunization history:: Adult Immunizations up to date.                                          

- Social history:: Smoking status: Patient denies any tobacco usage or history of.                

                                                                                                  

                                                                                                  

Screenin:03 [facility] ED Fall Risk Assessment (Adult) History of falling in the last 3 months,       mb9 

      including since admission No falls in past 3 months (0 pts) Confusion or Disorientation     

      No (0 pts) Intoxicated or Sedated No (0 pts) Impaired Gait No (0 pts) Mobility Assist       

      Device Used No (0 pt) Altered Elimination No (0 pt) Score/Fall Risk Level 0 - 2 = Low       

      Risk Oriented to surroundings, Maintained a safe environment, Educated pt \T\ family on     

      fall prevention, incl call for assistance when getting out of bed. Abuse screen: Denies     

      threats or abuse. Nutritional screening: No deficits noted. Tuberculosis screening: No      

      symptoms or risk factors identified.                                                        

                                                                                                  

Assessment:                                                                                       

23:03 Reassessment: see triage assessment.                                                    mb9 

23:09 Reassessment: Ultrasound at bedside.                                                    mb9 

23:54 Reassessment: Patient and/or family updated on plan of care and expected duration. Pain mb9 

      level reassessed. Patient is alert, oriented x 3, equal unlabored respirations, skin        

      warm/dry/pink. Patient states feeling better.                                               

                                                                                             

00:16 Reassessment: Assumed care of pt from JOSE Castillo.                                     vc1 

                                                                                                  

Vital Signs:                                                                                      

                                                                                             

22:30  / 74; Pulse 102; Resp 18; Temp 98.2; Pulse Ox 99% on R/A; Weight 64.41 kg;       mb9 

      Height 5 ft. 2 in. ;                                                                        

23:34 BP 97 / 60; Pulse 77; Resp 16; Pulse Ox 98% on R/A;                                     mb9 

22:30 Body Mass Index 25.97 (64.41 kg, 157.48 cm)                                             mb9 

                                                                                                  

ED Course:                                                                                        

22:24 Patient arrived in ED.                                                                  ag3 

22:28 Josie Gaytan RN is Primary Nurse.                                               mb9 

22:29 Arm band placed on.                                                                     mb9 

22:31 Jeramy Perez PA is PHCP.                                                              [facility] 

22:31 Jair Chen MD is Attending Physician.                                           jmm 

23:01 Triage completed.                                                                       mb9 

23:04 No provider procedures requiring assistance completed. Inserted saline lock: 20 gauge   mb9 

      in right antecubital area, using aseptic technique.                                         

23:09 Placed in gown. Bed in low position. Call light in reach. Side rails up X 1. Client     mb9 

      placed on continuous cardiac and pulse oximetry monitoring. NIBP monitoring applied.        

      Door closed. Noise minimized. Warm blanket given.                                           

23:32 US OB Limited In Process Unspecified.                                                   EDMS

23:51 Urinalysis w/ reflexes Sent.                                                            mb9 

                                                                                             

01:07 IV discontinued, intact, bleeding controlled, No redness/swelling at site. Pressure     vc1 

      dressing applied.                                                                           

                                                                                                  

Administered Medications:                                                                         

                                                                                             

22:50 Drug: NS 0.9%  ml Route: IV; Rate: bolus; Site: right antecubital;                mb9 

23:48 Follow up: Response: No adverse reaction; IV Status: Completed infusion                 mb9 

22:50 Drug: metoCLOPramide IVP 20 mg Route: IVP; Site: right antecubital;                     mb9 

23:48 Follow up: Response: No adverse reaction                                                mb9 

23:04 Drug: diphenhydrAMINE IVP 12.5 mg Route: IVP; Site: right antecubital;                  mb9 

23:48 Follow up: Response: No adverse reaction                                                mb9 

23:51 Drug: Lactated Ringers Solution IV 1000 ml Route: IV; Rate: 1000 bolus; Site: right     mb9 

      antecubital;                                                                                

                                                                                                  

                                                                                                  

Medication:                                                                                       

23:03 VIS not applicable for this client.                                                     mb9 

                                                                                                  

Outcome:                                                                                          

                                                                                             

00:55 Discharge ordered by MD.                                                                victor hugo 

01:07 Discharged to home ambulatory.                                                          vc1 

01:07 Condition: good                                                                             

01:07 Discharge instructions given to patient, Instructed on discharge instructions, follow       

      up and referral plans. medication usage, Demonstrated understanding of instructions,        

      follow-up care, medications, Prescriptions given X 1.                                       

01:08 Patient left the ED.                                                                    vc1 

                                                                                                  

Signatures:                                                                                       

Dispatcher [employer]                           EDMS                                                 

Jeramy Perez PA PA jmm Gomez, Alice ag3                                                  

Barbara Cook, RN                    RN   vc1                                                  

Josie Gaytan RN                 RN   mb9                                                  

                                                                                                  

Corrections: (The following items were deleted from the chart)                                    

                                                                                             

23:42 23:34 BP 93 / 59; Pulse 94bpm; Resp 17bpm; Pulse Ox 98% RA; mb9                         mb9 

                                                                                                  

**************************************************************************************************

## 2023-05-24 NOTE — EDPHYS
Physician Documentation                                                                           

 [facility]                                                                 

Name: Nickie Keenan                                                                             

Age: 27 yrs                                                                                       

Sex: Female                                                                                       

: 1995                                                                                   

MRN: [id number]                                                                                   

Arrival Date: 2023                                                                          

Time: 22:22                                                                                       

Account#: [id number]                                                                            

Bed 14                                                                                            

Private MD:                                                                                       

ED Physician Jair Chen                                                                    

HPI:                                                                                              

                                                                                             

22:38 This 27 yrs old  Female presents to ER via Ambulatory with complaints of 15     jmm 

      WEEKS PREGNANT, HEADACHE, CRAMPING.                                                         

22:38 The patient complains of pain to the forehead. This is a 27 year old female  that   jmm 

      presents to the ED with complaints of headache which has been ongoing for 3 days.           

      Prescribed magnesium with no relief. Also complains of pelvic pain. Denies vaginal          

      bleeding.                                                                                   

                                                                                                  

OB/GYN:                                                                                           

23:03  2, Full Term 1, Premature 0,  0, Living 1                               mb9 

                                                                                                  

Historical:                                                                                       

- Allergies:                                                                                      

23:01 No Known Allergies;                                                                     mb9 

- Home Meds:                                                                                      

23:01 None [Active];                                                                          mb9 

- PMHx:                                                                                           

23:01 None;                                                                                   mb9 

- PSHx:                                                                                           

23:01 None;                                                                                   mb9 

                                                                                                  

- Immunization history:: Adult Immunizations up to date.                                          

- Social history:: Smoking status: Patient denies any tobacco usage or history of.                

                                                                                                  

                                                                                                  

ROS:                                                                                              

22:38 Constitutional: Negative for fever, chills, and weight loss, Cardiovascular: Negative   jmm 

      for chest pain, palpitations, and edema, Respiratory: Negative for shortness of breath,     

      cough, wheezing, and pleuritic chest pain.                                                  

22:38 : Positive for pelvic pain.                                                               

22:38 Neuro: Positive for headache.                                                               

22:38 All other systems are negative.                                                             

                                                                                                  

Exam:                                                                                             

22:38 Constitutional:  This is a well developed, well nourished patient who is awake, alert,  jmm 

      and in no acute distress. Head/Face:  atraumatic. Eyes:  EOMI, no conjunctival erythema     

      appreciated ENT:  Moist Mucus Membranes Neck:  Trachea midline, Supple Chest/axilla:        

      Normal chest wall appearance and motion.   Cardiovascular:  Regular rate and rhythm.        

      No edema appreciated Respiratory:  Normal respirations, no respiratory distress             

      appreciated Abdomen/GI:  Non distended Back:  Normal ROM Skin:  General appearance          

      color normal MS/ Extremity:  Moves all extremities, no obvious deformities appreciated,     

      no edema noted to the lower extremities  Neuro:  Awake and alert Psych:  Behavior is        

      normal, Mood is normal, Patient is cooperative and pleasant                                 

                                                                                                  

Vital Signs:                                                                                      

22:30  / 74; Pulse 102; Resp 18; Temp 98.2; Pulse Ox 99% on R/A; Weight 64.41 kg;       mb9 

      Height 5 ft. 2 in. ;                                                                        

23:34 BP 97 / 60; Pulse 77; Resp 16; Pulse Ox 98% on R/A;                                     mb9 

22:30 Body Mass Index 25.97 (64.41 kg, 157.48 cm)                                             [facility] 

                                                                                                  

MDM:                                                                                              

22:38 Patient medically screened.                                                             [facility] 

                                                                                             

00:53 Differential diagnosis: migraine, sah, meningitis. Data reviewed: vital signs, nurses   [facility] 

      notes, lab test result(s), radiologic studies, ultrasound. I considered the following       

      discharge prescriptions or medication management in the emergency department                

      Medications were administered in the Emergency Department. See MAR. Counseling: I had a     

      detailed discussion with the patient and/or guardian regarding: the historical points,      

      exam findings, and any diagnostic results supporting the discharge/admit diagnosis, lab     

      results, radiology results, the need for outpatient follow up, to return to the             

      emergency department if symptoms worsen or persist or if there are any questions or         

      concerns that arise at home.                                                                

                                                                                                  

                                                                                             

22:39 Order name: Urinalysis w/ reflexes; Complete Time: 00:21                                [facility] 

                                                                                             

22:38 Order name: US OB Limited                                                               [facility] 

                                                                                             

22:39 Order name: Saline Lock; Complete Time: 23:04                                           [facility] 

                                                                                                  

Administered Medications:                                                                         

                                                                                             

22:50 Drug: NS 0.9%  ml Route: IV; Rate: bolus; Site: right antecubital;                mb9 

23:48 Follow up: Response: No adverse reaction; IV Status: Completed infusion                 mb9 

22:50 Drug: metoCLOPramide IVP 20 mg Route: IVP; Site: right antecubital;                     mb9 

23:48 Follow up: Response: No adverse reaction                                                mb9 

23:04 Drug: diphenhydrAMINE IVP 12.5 mg Route: IVP; Site: right antecubital;                  mb9 

23:48 Follow up: Response: No adverse reaction                                                mb9 

23:51 Drug: Lactated Ringers Solution IV 1000 ml Route: IV; Rate: 1000 bolus; Site: right     mb9 

      antecubital;                                                                                

                                                                                                  

                                                                                                  

Disposition Summary:                                                                              

23 00:55                                                                                    

Discharge Ordered                                                                                 

      Location: Home                                                                          jmm 

      Condition: Stable                                                                       jmm 

      Diagnosis                                                                                   

        - Headache                                                                            jmm 

        - Pelvic and perineal pain                                                            jmm 

      Followup:                                                                               jmm 

        - With: Private Physician                                                                  

        - When: 1 - 2 days                                                                         

        - Reason: Recheck today's complaints, Continuance of care, Re-evaluation by your           

      physician                                                                                   

      Discharge Instructions:                                                                     

        - Discharge Summary Sheet                                                             jmm 

        - General Headache Without Cause                                                      jmm 

      Forms:                                                                                      

        - Medication Reconciliation Form                                                      jmm 

        - Thank You Letter                                                                    jmm 

        - Antibiotic Education                                                                jmm 

        - Prescription Opioid Use                                                             jmm 

      Prescriptions:                                                                              

        - Reglan 5 mg Oral tablet                                                                  

            - take 1 tablet by ORAL route every 4 to 6 hours As needed; 30 tablet; Refills:   jmm 

      0, Product Selection Permitted                                                              

Addendum:                                                                                         

2023                                                                                        

     20:17 Co-signature as Attending Physician, Jair Chen MD I agree with the assessment    s
p4

           and plan of care. I reviewed the patient's care provided by the [facility]      

           Provider and agree with the diagnosis and treatment plan.                              

                                                                                                  

Signatures:                                                                                       

Dispatcher [employer]                           EDJeramy Lawrence PA PA jmm Breneman, Mary Beth, RN                 RN   mb9                                                  

Jair Chen MD MD   sp4                                                  

                                                                                                  

**************************************************************************************************

## 2023-05-25 NOTE — RAD REPORT
EXAM DESCRIPTION:  US - OB Limited - 5/23/2023 11:30 pm

 

CLINICAL HISTORY:  27 years Female pelvic pain, 15 weeks

 

COMPARISON:  None.

 

TECHNIQUE:  Real-time, gray scale, and Doppler transabdominal sonographic imaging was performed to ev
aluate the gravid uterus.

 

FINDINGS:  There is a single live intrauterine gestation in vertex presentation with a fetal heart ra
te of 150 bpm. The placenta is fundal in position without placenta previa or sub-placental collection
. The cervix is closed, measuring 3.2 cm. Various biometric measurements reveal an estimated gestatio
nal age of 15 weeks 4 days. There is subjectively normal amount of amniotic fluid

Both maternal ovaries appear normal.

 

IMPRESSION:  Normal-appearing single live intrauterine gestation of approximately 15 weeks 4 days.

 

Electronically signed by:   Eliza Arzate MD   5/23/2023 11:50 PM CDT Workstation: [id number]

 

 

 

 

Due to temporary technical issues with the PACS/Fluency reporting system, reports are being signed by
 the in house radiologists without review as a courtesy to insure prompt reporting. The interpreting 
radiologist is fully responsible for the content of the report.

## 2023-06-21 ENCOUNTER — HOSPITAL ENCOUNTER (EMERGENCY)
Dept: HOSPITAL 97 - ER | Age: 28
Discharge: HOME | End: 2023-06-21
Payer: COMMERCIAL

## 2023-06-21 VITALS — SYSTOLIC BLOOD PRESSURE: 98 MMHG | OXYGEN SATURATION: 98 % | TEMPERATURE: 98.2 F | DIASTOLIC BLOOD PRESSURE: 68 MMHG

## 2023-06-21 DIAGNOSIS — M25.512: Primary | ICD-10-CM

## 2023-06-21 NOTE — EDPHYS
Physician Documentation                                                                           

 [facility]                                                                 

Name: Nickie Keenan                                                                             

Age: 28 yrs                                                                                       

Sex: Female                                                                                       

: 1995                                                                                   

MRN: [id number]                                                                                   

Arrival Date: 2023                                                                          

Time: 12:04                                                                                       

Account#: [id number]                                                                            

Bed 11                                                                                            

Private MD:                                                                                       

ED Physician Jared Encinas                                                                     

HPI:                                                                                              

                                                                                             

13:07 This 28 yrs old  Female presents to ER via Ambulatory with complaints of        rt  

      Shoulder Pain.                                                                              

13:07 Patient who is 20 weeks pregnant presents to the ED with left shoulder pain that        rt  

      occurred 2 days ago when she threw a ball with her son. Patient states that the pain is     

      located to the shoulder, radiates to the scapular region. Is aching in nature,              

      otherwise radiating, mild in severity, no other aggravating alleviating factors.            

      Patient states the pain is worse with movement. Denies other acute complaints..             

                                                                                                  

Historical:                                                                                       

- Allergies:                                                                                      

12:40 No Known Allergies;                                                                     ph  

- PMHx:                                                                                           

12:40 None;                                                                                   ph  

                                                                                                  

- Immunization history:: Adult Immunizations up to date.                                          

- Social history:: Smoking status: Patient denies any tobacco usage or history of.                

- Family history:: not pertinent.                                                                 

                                                                                                  

                                                                                                  

ROS:                                                                                              

13:07 Constitutional: Negative for fever, chills, and weight loss, Neck: Negative for injury, rt  

      pain, and swelling, Skin: Negative for injury, rash, and discoloration, Neuro: Negative     

      for headache, weakness, numbness, tingling, and seizure, Psych: Negative for                

      depression, anxiety, suicide ideation, homicidal ideation, and hallucinations.              

13:07 MS/extremity: Positive for decreased range of motion, pain, Negative for                    

                                                                                                  

Exam:                                                                                             

13:07 Constitutional:  This is a well developed, well nourished patient who is awake, alert,  rt  

      and in no acute distress. Head/Face:  Normocephalic, atraumatic. Skin:  Warm, dry with      

      normal turgor.  Normal color with no rashes, no lesions, and no evidence of cellulitis.     

      Neuro:  Awake and alert, GCS 15, oriented to person, place, time, and situation.            

      Cranial nerves II-XII grossly intact.  Motor strength 5/5 in all extremities.  Sensory      

      grossly intact.  Cerebellar exam normal.  Normal gait. Psych:  Awake, alert, with           

      orientation to person, place and time.  Behavior, mood, and affect are within normal        

      limits.                                                                                     

13:07 Musculoskeletal/extremity: Tenderness overlying the right AC joint, no deformities          

      noted, pulses, motor, sensation intact.                                                     

                                                                                                  

Vital Signs:                                                                                      

12:38 BP 98 / 68; Pulse 92; Resp 18; Temp 98.2; Pulse Ox 98% on R/A; Weight 64.86 kg; Height  ph  

      5 ft. 2 in. ; Pain 10/10;                                                                   

12:38 Body Mass Index 26.15 (64.86 kg, 157.48 cm)                                             ph  

12:38 Pain Scale: Adult                                                                       ph  

                                                                                                  

MDM:                                                                                              

12:45 Patient medically screened.                                                             rt  

20:33 Differential diagnosis: Tendinitis, rotator cuff tear, AC separation, dislocation. Data rt  

      reviewed: vital signs, nurses notes, radiologic studies. Independent interpretation of      

      the following test(s) in the Emergency Department X-Ray: My interpretation is No            

      dislocation seen on interpretation of the x-ray images. ED course: Patient presents to      

      the ED with shoulder pain, there is no evidence of neurovascular compromise, x-rays         

      reveal either calcific tendinitis versus healing injury, possibly rotator cuff. I           

      discussed this with the patient. Patient states that she believes that Tylenol caused       

      autism and her prior son. For this reason, I do not wish to give her Tylenol at this        

      time. Do not believe that there is any other safe for the patient and I discussed with      

      her. Discussed conservative home care as well as outpatient follow-up..                     

                                                                                                  

                                                                                             

12:52 Order name: Shoulder Left (2 View) XRAY; Complete Time: 13:33                           rt  

                                                                                                  

Administered Medications:                                                                         

No medications were administered                                                                  

                                                                                                  

                                                                                                  

Disposition Summary:                                                                              

23 13:41                                                                                    

Discharge Ordered                                                                                 

      Location: Home                                                                          rt  

      Condition: Stable                                                                       rt  

      Diagnosis                                                                                   

        - Pain in left shoulder                                                               rt  

      Followup:                                                                               rt  

        - With: Private Physician                                                                  

        - When: 2 - 3 days                                                                         

        - Reason:                                                                                  

      Discharge Instructions:                                                                     

        - Discharge Summary Sheet                                                             rt  

        - Shoulder Pain                                                                       rt  

        - How to Use Cold Therapy, Easy-to-Read                                               rt  

        - Shoulder Range of Motion Exercises                                                  rt  

      Forms:                                                                                      

        - Medication Reconciliation Form                                                      rt  

        - Thank You Letter                                                                    rt  

        - Antibiotic Education                                                                rt  

        - Prescription Opioid Use                                                             rt  

Signatures:                                                                                       

Dispatcher [employer]                           Crystal Mcfarland RN RN   ph                                                   

Jared Encinas MD MD   rt                                                   

                                                                                                  

**************************************************************************************************

## 2023-06-21 NOTE — XMS REPORT
Continuity of Care Document

                            Created on:2023



Patient:JESSE NOWAK

Sex:Female

:1995

External Reference #:[id number]





Demographics







                          Address                   [address] [place] [address]

 

                          Home Phone                [phone]

 

                          Mobile Phone              [phone]

 

                          Email Address             [email or web address]

 

                          Preferred Language        English

 

                          Marital Status            Unknown

 

                          Jewish Affiliation     Unknown

 

                          Race                      Unknown

 

                          Additional Race(s)        Black or 



                                                    Unavailable

 

                          Ethnic Group              Not  or 









Author







                          Organization              [facility]

t

 

                          Address                   [address] [place] [address]



                                                    [place] [address]

 

                          Phone                     [phone]









Support







                Name            Relationship    Address         Phone

 

                Anisa Espana     Mother          Unavailable     [phone]









Care Team Providers







                    Name                Role                Phone

 

                    PCP, PATIENT DOES NOT HAVE A Primary Care Physician Unavaila

EMMANUELLE Cox       Attending Clinician Unavailable

 

                    Emmanuelle Weeks MD    Attending Clinician [phone]

 

                    2, [facility]          Attending Clinician Unavailable

 

                    Po, [facility] Main   Attending Clinician Unavailable

 

                    Doctor Unassigned, No Name Attending Clinician Unavailable

 

                    RADIOLOGY           Attending Clinician Unavailable

 

                    Radiology           Attending Clinician Unavailable

 

                    Rachael Martinez PA-C Attending Clinician [phone]

 

                    RACHAEL MARTINEZ     Attending Clinician Unavailable

 

                    Unknown, Attending  Attending Clinician Unavailable

 

                    Joyce Wilburn Attending Clinician [phone]

 

                    JOYCE FALLON    Attending Clinician Unavailable

 

                    Etienne Dominguez PA-C   Attending Clinician [phone]

 

                    ETIENNE DOMINGUEZ        Attending Clinician Unavailable

 

                    Nurse, [facility] Attending Clinician Unavailable

 

                    Suzette Jorgensen MD Attending Clinician [phone]

 

                    SUZETTE JORGENSEN Attending Clinician Unavailable

 

                    ROSANNA JETER Attending Clinician Unavailable

 

                    JOSÉ MIGUEL WEEKS            Admitting Clinician Unavailable

 

                    EMMANUELLE WEEKS       Admitting Clinician Unavailable









Payers







           Payer Name Policy Type Policy Number Effective Date Expiration Date MICHAEL busby

 

           [facility]            [id number]  2018            



           [place]ICE [place]                       00:00:00              

 

           [facility]            [id number]  [id number] 



           CHOICE STAR                       00:00:00   00:00:00   







Problems







       Condition Condition Condition Status Onset  Resolution Last   Treating Co

mments 

Source



       Name   Details Category        Date   Date   Treatment Clinician        



                                                 Date                 

 

       High-risk High-risk Disease Active                              Uni

vers



       pregnancy pregnancy               6-14                               ity 

of



       in second in second               00:00:                             [place]

[place]



       trimester trimester               00                                 Medi

mihaela



                                                                      Branch

 

       Nonintract Nonintract Disease Active                              U

nivers



       able   able                 6-14                               ity of



       headache, headache,               00:00:                             [place]

[place]



       unspecifie unspecifie               00                                 Me

dical



       d      d                                                       Branch



       chronicity chronicity                                                  



       pattern, pattern,                                                  



       unspecifie unspecifie                                                  



       d headache d headache                                                  



       type   type                                                    

 

       Chest pain Chest pain Disease Active 2021                             U

nivers



       in adult in adult               0-17                               ity of



                                   00:00:                             [place]



                                   00                                 Medical



                                                                      Branch

 

       Family Family Disease Active 2021                             [facility]



       history of history of               0-17                               it

y of



       heart  heart                00:00:                             [place]



       disease in disease in               00                                 Me

dical



       both   both                                                    Branch



       parents parents                                                  

 

       B12    B12    Disease Active 2019                      Overview: Univted

s



       deficiency deficiency               1-13                        Formatt[facility]

 ity of



                                   00:00:                      g of this [place]



                                   00                          note   Medical



                                                               might be Branch



                                                               different 



                                                               from the 



                                                               original. 



                                                               B12 level 



                                                               <400,  



                                                               prescribe 



                                                               d an oral 



                                                               supplemen 



                                                               t      

 

       Tobacco Tobacco Disease Active 2019                             Univers



       use    use                  0-11                               ity of



                                   00:00:                             [place]



                                   00                                 Medical



                                                                      Branch

 

       Anxiety Anxiety Disease Active 2019                             Univers



       and    and                  0-11                               ity of



       depression depression               00:00:                             [place]

[place]



                                   00                                 Medical



                                                                      Branch

 

       Skin   Skin   Disease Active 2019                             Univers



       hypopigmen hypopigmen               0-11                               it

y of



       tation tation               00:00:                             [place]



                                   00                                 Medical



                                                                      Branch

 

       Papanicola Papanicola Disease Active 2019                             U

nivers



       ou smear ou smear               0-01                               ity of



       of cervix of cervix               00:00:                             [place]

[place]



       with   with                 00                                 Medical



       atypical atypical                                                  Branch



       squamous squamous                                                  



       cells  cells                                                   



       cannot cannot                                                  



       exclude exclude                                                  



       high grade high grade                                                  



       squamous squamous                                                  



       intraepith intraepith                                                  



       elial  elial                                                   



       lesion lesion                                                  



       (ASC-H) (ASC-H)                                                  

 

       Seasonal Seasonal Disease Active 2019                             Unive

rs



       allergies allergies               0-01                               ity 

of



                                   00:00:                             [place]



                                   00                                 Medical



                                                                      Branch







Allergies, Adverse Reactions, Alerts







       Allergy Allergy Status Severity Reaction(s) Onset  Inactive Treating Comm

ents 

Source



       Name   Type                        Date   Date   Clinician        

 

       NO KNOWN Drug   Active                                           Univers



       ALLERGIE Class                                                   ity of



       S                                                              [facility]







Social History







           Social Habit Start Date Stop Date  Quantity   Comments   Source

 

           ASSERTION  2023            Pregnant              [facility] of



                      00:00:00                                    [place] [facility]



                                                                  Branch

 

           History SDOH                                             University o

f



           Alcohol Frequency                                             [place] [facility]

edical



                                                                  Branch

 

           History SDOH                                             University o

f



           Alcohol Std                                             [place] [facility]



           Drinks                                                 Branch

 

           History SDOH                                             University o

f



           Alcohol Binge                                             [place] [facility]

al



                                                                  [facility]

 

           Alcohol intake 2023 Ex-drinker            University

 of



                      00:00:00   00:00:00   (finding)             [facility]

 

           Exposure to 2023 Not sure              [facility]



           SARS-CoV-2 00:00:00   11:04:00                         [facility]



           (event)                                                [place]

 

           Tobacco use and 2023 Smokeless tobacco            Un

iversity of



           exposure   00:00:00   00:00:00   non-user              [facility]

 

           Tobacco Comment 2023 1 Pack per 2            Univers

ity of



                      00:00:00   00:00:00   weeks,                [facility]

 

           History of            2020 Cigarette Smoker            [facility]



           tobacco use            00:00:00                         [facility]

 

           Alcohol Comment 2019 ocassionally-            Univer

sity of



                      00:00:00   00:00:00   once a week            [facility]

 

           Sex Assigned At 1995                       Universit

y of



           Birth      00:00:00   00:00:00                         [facility]









                Smoking Status  Start Date      Stop Date       Source

 

                Ex-smoker       2023 00:00:00 2023 00:00:00 [facility]







Medications







       Ordered Filled Start  Stop   Current Ordering Indication Dosage Frequency

 Signature

                    Comments            Components          Source



     Medication Medication Date Date Medication? Clinician                (SIG) 

          



     Name Name                                                   

 

     magnesium            Yes       [id number] 400mg      Take 1           U

nivers



     oxide 400      5-17                               tablet by           ity o

f



     mg (241.3      00:00:                               mouth in           [place]

[place]



     mg        00                                 the            Medical



     magnesium)                                         morning.           Branc

h



     tablet                                                        

 

     magnesium            Yes       [id number] 400mg      Take 1           U

nivers



     oxide 400      5-17                               tablet by           ity o

f



     mg (241.3      00:00:                               mouth in           [place]

s



     mg        00                                 the            Medical



     magnesium)                                         morning.           Branc

h



     tablet                                                        

 

     magnesium            Yes       [id number] 400mg      Take 1           U

nivers



     oxide 400      5-17                               tablet by           ity o

f



     mg (241.3      00:00:                               mouth in           [place]

s



     mg        00                                 the            Medical



     magnesium)                                         morning.           Branc

h



     tablet                                                        

 

     PNV             Yes                      Take by           Univers



     no.95/eugenio      3-23                               mouth.           ity of



     us        14:31:                                              [place]



     fum/folic      29                                                Medical



     ac                                                          Branch



     (PRENATAL                                                        



     ORAL)                                                        

 

     PNV       -0      Yes                      Take by           Univers



     no.95/eugenio      3-23                               mouth.           ity of



     us        14:31:                                              [place]



     fum/folic      29                                                Medical



     ac                                                          Branch



     (PRENATAL                                                        



     ORAL)                                                        

 

     PNV       -0      Yes                      Take by           Univers



     no.95/eugenio      3-23                               mouth.           ity of



     us        14:31:                                              [place]



     fum/folic      29                                                Medical



     ac                                                          Branch



     (PRENATAL                                                        



     ORAL)                                                        

 

     PNV       -0      Yes                      Take by           Univers



     no.95/eugenio      3-23                               mouth.           ity of



     us        14:31:                                              [place]



     fum/folic      29                                                Medical



     ac                                                          Branch



     (PRENATAL                                                        



     ORAL)                                                        

 

     PNV       -0      Yes                      Take by           Univers



     no.95/eugenio      3-23                               mouth.           ity of



     us        14:31:                                              [place]



     fum/folic      29                                                Medical



     ac                                                          Branch



     (PRENATAL                                                        



     ORAL)                                                        

 

     PNV       -0      Yes                      Take by           Univers



     no.95/eugenio      3-23                               mouth.           ity of



     us        14:31:                                              [place]



     fum/folic      29                                                Medical



     ac                                                          Branch



     (PRENATAL                                                        



     ORAL)                                                        

 

     PNV       -0      Yes                      Take by           Univers



     no.95/eugenio      3-23                               mouth.           ity of



     us        14:31:                                              [place]



     fum/folic      29                                                Medical



     ac                                                          Branch



     (PRENATAL                                                        



     ORAL)                                                        

 

     PNV       -0      Yes                      Take by           Univers



     no.95/eugenio      3-23                               mouth.           ity of



     us        14:31:                                              [place]



     fum/folic      29                                                Medical



     ac                                                          Branch



     (PRENATAL                                                        



     ORAL)                                                        

 

     PNV       -0      Yes                      Take by           Univers



     no.95/eugenio      3-23                               mouth.           ity of



     us        14:31:                                              [place]



     fum/folic      29                                                Medical



     ac                                                          Branch



     (PRENATAL                                                        



     ORAL)                                                        

 

     PNV       -0      Yes                      Take by           Univers



     no.95/eugenio      3-23                               mouth.           ity of



     us        14:31:                                              [place]



     fum/folic      29                                                Medical



     ac                                                          Branch



     (PRENATAL                                                        



     ORAL)                                                        

 

     No known      2022      No                       No known           Unive

rs



     medications      2-10                               medication           it

y of



               10:18:                               s              [place]



               27                                                Medical



                                                                 Branch

 

     fluconazole      2022- No        [id number] 150mg      Take 1        

   Univers



     (DIFLUCAN)      2-10 12-                          tablet by           ity

 of



     150 mg      00:00: 05:59                          mouth once           [place]

s



     tablet      00   :00                           now for 1           Medical



                                                  dose.           Branch

 

     medroxyPROG      2022- No                       medroxypro          

 Univers



     ESTERone      07                          gesterone           ity o

f



     150 mg/mL      09:41: 00:00                          150 mg/mL           Te

[place]



     injection      32   :00                           intramuscu           Medi

mihaela



                                                  lar            Branch



                                                  suspension           

 

     medroxyPROG      2022- No                       medroxypro          

 Univers



     ESTERone      2-07 12                          gesterone           ity o

f



     150 mg/mL      09:41: 00:00                          150 mg/mL           Te

[place]



     injection      32   :00                           intramuscu           Medi

mihaela



                                                  lar            Branch



                                                  suspension           

 

     hydrocortis      2022      Yes       [id number]           Apply to         

  Univers



     one                                      external           ity of



     (PROCTOSOL      00:00:                               hemorrhoid           [facility]

[place]



     HC) 2.5 %      00                                 BID after           Medic

al



     rectal                                         bowel           Branch



     cream                                         movement.           

 

     hydrocortis      2022      Yes       [id number]           Apply to         

  [facility]



     one                                      external           ity of



     (PROCTOSOL      00:00:                               hemorrhoid           T

[place]



     HC) 2.5 %      00                                 BID after           Medic

al



     rectal                                         bowel           Branch



     cream                                         movement.           

 

     hydrocortis      2022- No        [id number]           Apply to        

   [facility]



     one                                 external           ity of



     (PROCTOSOL      00:00: 00:00                          hemorrhoid           

[place]



     HC) 2.5 %      00   :00                           BID after           Medic

al



     rectal                                         bowel           Branch



     cream                                         movement.           

 

     hydrocortis      2022- No        [id number]           Apply to        

   [facility]



     one                                 external           ity of



     (PROCTOSOL      00:00: 00:00                          hemorrhoid           

[place]



     HC) 2.5 %      00   :00                           BID after           [facility]

al



     rectal                                         bowel           [facility]



     cream                                         movement.           

 

     Nitrofurant      2022- No        [id number] 100mg      Take 1         

  [facility]



     oin&Nit.      7-                          capsule by           ity 

of



     Macrocryst      00:00: 04:59                          mouth in           [place]

[place]



     (MACROBID)      00   :00                           the            Medical



     100 mg                                         morning           Branch



     capsule                                         and 1           



                                                  capsule in           



                                                  the            



                                                  evening.           



                                                  Do all           



                                                  this for 7           



                                                  days.           

 

     medroxyPROG      2021      Yes                      medroxypro           

[facility]



     ESTERone      1-04                               gesterone           ity of



     150 mg/mL      15:05:                               150 mg/mL           [facility]

as



     injection      14                                 intramuscu           Medi

mihaela



                                                  lar            Branch



                                                  suspension           

 

     medroxyPROG      2021      Yes                      medroxypro           

[facility]



     ESTERone      1-04                               gesterone           ity of



     150 mg/mL      15:05:                               150 mg/mL           [facility]

as



     injection      14                                 intramuscu           Medi

mihaela



                                                  lar            Branch



                                                  suspension           

 

     medroxyPROG      2021      Yes                      medroxypro           

[facility]



     ESTERone      1-04                               gesterone           ity of



     150 mg/mL      15:05:                               150 mg/mL           [facility]

as



     injection      14                                 intramuscu           Medi

mihaela



                                                  lar            Branch



                                                  suspension           

 

     medroxyPROG      2021      Yes                      medroxypro           

[facility]



     ESTERone      1-04                               gesterone           ity of



     150 mg/mL      15:05:                               150 mg/mL           [facility]

as



     injection      14                                 intramuscu           Medi

mihaela



                                                  lar            Branch



                                                  suspension           

 

     medroxyPROG      2021      Yes                      medroxypro           

[facility]



     ESTERone      1-04                               gesterone           ity of



     150 mg/mL      15:05:                               150 mg/mL           [facility]

as



     injection      14                                 intramuscu           Medi

mihaela



                                                  lar            Branch



                                                  suspension           

 

     medroxyPROG      2021      Yes                      medroxypro           

[facility]



     ESTERone      1-04                               gesterone           ity of



     150 mg/mL      15:05:                               150 mg/mL           [facility]

as



     injection      14                                 intramuscu           Medi

mihaela



                                                  lar            Branch



                                                  suspension           

 

     citalopram      2021      Yes       [id number] 20mg      Take 1           U

nivers



     20 mg      0-12                               tablet by           ity of



     tablet      00:00:                               mouth           [place]



               00                                 daily.           [facility]

 

     citalopram      2021- No        [id number] 20mg      Take 1           

[facility]



     20 mg      0-12 -                          tablet by           ity of



     tablet      00:00: 00:00                          [facility]           [place]



               00   :00                           daily.           [facility]

 

     citalopram      2021- No        [id number] 20mg      Take 1           

[facility]



     20 mg      0-12 -26                          tablet by           ity of



     tablet      00:00: 00:00                          mouth           [place]



               00   :00                           daily.           [facility]







Immunizations







           Ordered Immunization Filled Immunization Date       Status     Commen

   Source



           Name       Name                                        

 

           HPV9                  2021 Completed             [facility]



                                 00:00:00                         [facility]

 

           HPV9                  2021 Completed             [facility]



                                 00:00:00                         [facility]

 

           HPV9                  2021 Completed             [facility]



                                 00:00:00                         [facility]

 

           HPV9                  2021 Completed             [facility]



                                 00:00:00                         [facility]

 

           HPV9                  2021 Completed             [facility]



                                 00:00:00                         [facility]

 

           HPV9                  2021 Completed             [facility]



                                 00:00:00                         [facility]

 

           HPV9                  2021 Completed             [facility]



                                 00:00:00                         [facility]

 

           HPV9                  2021 Completed             [facility]



                                 00:00:00                         [facility]



                                                                  [facility]

 

           HPV9                  2021 Completed             [facility]



                                 00:00:00                         [place] [facility]



                                                                  [facility]

 

           HPV9                  2021 Completed             [facility]



                                 00:00:00                         [place] [facility]



                                                                  [facility]

 

           HPV9                  2021 Completed             [facility]



                                 00:00:00                         [place] [facility]



                                                                  [facility]

 

           HPV9                  2021 Completed             [facility]



                                 00:00:00                         [place] [facility]



                                                                  [facility]

 

           HPV9                  2021 Completed             [facility]



                                 00:00:00                         [place] [facility]



                                                                  [facility]

 

           HPV9                  2021 Completed             [facility]



                                 00:00:00                         [place] [facility]



                                                                  [facility]

 

           HPV9                  2021 Completed             [facility]



                                 00:00:00                         [place] [facility]



                                                                  [facility]

 

           HPV9                  2021 Completed             [facility]



                                 00:00:00                         [place] [facility]



                                                                  [facility]

 

           HPV9                  2021 Completed             [facility]



                                 00:00:00                         [place] [facility]



                                                                  [facility]

 

           HPV9                  2021 Completed             [facility]



                                 00:00:00                         [facility]



                                                                  Br[facility]

 

           HPV9                  2021 Completed             [facility]



                                 00:00:00                         [facility]



                                                                  Branch

 

           HPV9                  2021 Completed             [facility]



                                 00:00:00                         [facility]



                                                                  [facility]

 

           HPV9                  2021 Completed             [facility]



                                 00:00:00                         [facility]



                                                                  Br[facility]

 

           HPV9                  2021 Completed             [facility]



                                 00:00:00                         [facility]



                                                                  [facility]

 

           HPV9                  2021 Completed             [facility]



                                 00:00:00                         [place] [facility]



                                                                  [facility]

 

           HPV9                  2021 Completed             [facility]



                                 00:00:00                         [facility]



                                                                  [facility]

 

           HPV9                  2021 Completed             [facility]



                                 00:00:00                         [facility]



                                                                  [facility]

 

           HPV9                  2021 Completed             [facility]



                                 00:00:00                         [facility]



                                                                  [facility]

 

           HPV9                  2021 Completed             [facility]



                                 00:00:00                         [facility]



                                                                  [facility]

 

           HPV9                  2021 Completed             [facility]



                                 00:00:00                         [facility]



                                                                  [facility]

 

           HPV9                  2021 Completed             [facility]



                                 00:00:00                         [facility]



                                                                  [facility]

 

           HPV9                  2021 Completed             [facility]



                                 00:00:00                         [place] [facility]



                                                                  [facility]

 

           HPV9                  2021 Completed             [facility]



                                 00:00:00                         [place] [facility]



                                                                  [facility]

 

           HPV9                  2021 Completed             [facility]



                                 00:00:00                         [place] [facility]



                                                                  [facility]

 

           HPV9                  2021 Completed             [facility]



                                 00:00:00                         [place] [facility]



                                                                  [facility]

 

           HPV9                  2021 Completed             [facility]



                                 00:00:00                         [place] [facility]



                                                                  [facility]

 

           HPV9                  2021 Completed             [facility]



                                 00:00:00                         [place] [facility]



                                                                  [facility]

 

           HPV9                  2021 Completed             [facility]



                                 00:00:00                         [place] [facility]



                                                                  [facility]

 

           HPV9                  2021 Completed             [facility]



                                 00:00:00                         [place] [facility]



                                                                  [facility]

 

           HPV9                  2021 Completed             [facility]



                                 00:00:00                         [place] [facility]



                                                                  [facility]

 

           HPV9                  2021 Completed             [facility]



                                 00:00:00                         [place] [facility]



                                                                  [facility]

 

           HPV9                  2021 Completed             [facility]



                                 00:00:00                         [place] [facility]



                                                                  [facility]

 

           HPV9                  2021 Completed             [facility]



                                 00:00:00                         [place] [facility]



                                                                  [facility]

 

           HPV9                  2021 Completed             [facility]



                                 00:00:00                         [place] [facility]



                                                                  [facility]

 

           HPV9                  2021 Completed             [facility]



                                 00:00:00                         [place] [facility]



                                                                  [facility]

 

           HPV9                  2021 Completed             [facility]



                                 00:00:00                         [place] [facility]



                                                                  [facility]

 

           HPV9                  2021 Completed             [facility]



                                 00:00:00                         [place] [facility]



                                                                  [facility]

 

           HPV9                  2021 Completed             [facility]



                                 00:00:00                         [place] [facility]



                                                                  [facility]

 

           HPV9                  2021 Completed             [facility]



                                 00:00:00                         [place] [facility]



                                                                  [facility]

 

           HPV9                  2021 Completed             [facility]



                                 00:00:00                         [place] [facility]



                                                                  [facility]

 

           HPV9                  2021 Completed             [facility]



                                 00:00:00                         [place] [facility]



                                                                  [facility]

 

           HPV9                  2021 Completed             [facility]



                                 00:00:00                         [place] [facility]



                                                                  [facility]

 

           HPV9                  2021 Completed             [facility]



                                 00:00:00                         [place] [facility]



                                                                  [facility]

 

           HPV9                  2021 Completed             [facility]



                                 00:00:00                         [facility]



                                                                  Br[facility]

 

           HPV9                  2020 Completed             [facility]



                                 00:00:00                         [place] [facility]



                                                                  [facility]

 

           HPV9                  2020 Completed             [facility]



                                 00:00:00                         [place] [facility]



                                                                  [facility]

 

           HPV9                  2020 Completed             [facility]



                                 00:00:00                         [place] [facility]



                                                                  [facility]

 

           HPV9                  2020 Completed             [facility]



                                 00:00:00                         [place] [facility]



                                                                  [facility]

 

           HPV9                  2020 Completed             [facility]



                                 00:00:00                         [place] [facility]



                                                                  [facility]

 

           HPV9                  2020 Completed             [facility]



                                 00:00:00                         [place] [facility]



                                                                  [facility]

 

           HPV9                  2020 Completed             [facility]



                                 00:00:00                         [place] [facility]



                                                                  [facility]

 

           HPV9                  2020 Completed             [facility]



                                 00:00:00                         [place] [facility]



                                                                  [facility]

 

           HPV9                  2020 Completed             [facility]



                                 00:00:00                         [place] [facility]



                                                                  [facility]

 

           HPV9                  2020 Completed             [facility]



                                 00:00:00                         [place] [facility]



                                                                  [facility]

 

           HPV9                  2020 Completed             [facility]



                                 00:00:00                         [place] [facility]



                                                                  [facility]

 

           HPV9                  2020 Completed             [facility]



                                 00:00:00                         [place] [facility]



                                                                  [facility]

 

           HPV9                  2020 Completed             [facility]



                                 00:00:00                         [place] [facility]



                                                                  [facility]

 

           HPV9                  2020 Completed             [facility]



                                 00:00:00                         [place] [facility]



                                                                  [facility]

 

           HPV9                  2020 Completed             [facility]



                                 00:00:00                         [place] [facility]



                                                                  [facility]

 

           HPV9                  2020 Completed             [facility]



                                 00:00:00                         [place] [facility]



                                                                  [facility]

 

           HPV9                  2020 Completed             [facility]



                                 00:00:00                         [place] [facility]



                                                                  [facility]

 

           HPV9                  2020 Completed             [facility]



                                 00:00:00                         [place] [facility]



                                                                  [facility]

 

           HPV9                  2020 Completed             [facility]



                                 00:00:00                         [place] [facility]



                                                                  [facility]

 

           HPV9                  2020 Completed             [facility]



                                 00:00:00                         [place] [facility]



                                                                  [facility]

 

           HPV9                  2020 Completed             [facility]



                                 00:00:00                         [place] [facility]



                                                                  [facility]

 

           HPV9                  2020 Completed             [facility]



                                 00:00:00                         [place] [facility]



                                                                  [facility]

 

           HPV9                  2020 Completed             [facility]



                                 00:00:00                         [place] [facility]



                                                                  [facility]

 

           HPV9                  2020 Completed             [facility]



                                 00:00:00                         [place] [facility]



                                                                  [facility]

 

           HPV9                  2020 Completed             [facility]



                                 00:00:00                         [place] [facility]



                                                                  [facility]

 

           HPV9                  2020 Completed             [facility]



                                 00:00:00                         [facility]

 

           TDAP                  2016 Completed             [facility]



                                 00:00:00                         [facility]



                                                                  Branch

 

           TDAP                  2016 Completed             [facility]



                                 00:00:00                         [facility]



                                                                  Branch

 

           TDAP                  2016 Completed             [facility]



                                 00:00:00                         [facility]



                                                                  [facility]

 

           TDAP                  2016 Completed             [facility]



                                 00:00:00                         [facility]



                                                                  [facility]

 

           TDAP                  2016 Completed             [facility]



                                 00:00:00                         [facility]



                                                                  [facility]

 

           TDAP                  2016 Completed             [facility]



                                 00:00:00                         [facility]



                                                                  [facility]

 

           TDAP                  2016 Completed             [facility]



                                 00:00:00                         [facility]



                                                                  [facility]

 

           TDAP                  2016 Completed             [facility]



                                 00:00:00                         [facility]



                                                                  Branch

 

           TDAP                  2016 Completed             [facility]



                                 00:00:00                         [facility]



                                                                  Branch

 

           TDAP                  2016 Completed             [facility]



                                 00:00:00                         [facility]



                                                                  [facility]

 

           TDAP                  2016 Completed             [facility]



                                 00:00:00                         [facility]

 

           TDAP                  2016 Completed             [facility]



                                 00:00:00                         [facility]

 

           TDAP                  2016 Completed             [facility]



                                 00:00:00                         [facility]

 

           TDAP                  2016 Completed             [facility]



                                 00:00:00                         [facility]

 

           TDAP                  2016 Completed             [facility]



                                 00:00:00                         [facility]

 

           TDAP                  2016 Completed             [facility]



                                 00:00:00                         [facility]

 

           TDAP                  2016 Completed             [facility]



                                 00:00:00                         [facility]

 

           TDAP                  2016 Completed             [facility]



                                 00:00:00                         [facility]

 

           TDAP                  2016 Completed             [facility]



                                 00:00:00                         [facility]

 

           TDAP                  2016 Completed             [facility]



                                 00:00:00                         [facility]

 

           TDAP                  2016 Completed             [facility]



                                 00:00:00                         [facility]

 

           TDAP                  2016 Completed             [facility]



                                 00:00:00                         [facility]

 

           TDAP                  2016 Completed             [facility]



                                 00:00:00                         [facility]

 

           TDAP                  2016 Completed             [facility]



                                 00:00:00                         [facility]

 

           TDAP                  2016 Completed             [facility]



                                 00:00:00                         [facility]

 

           TDAP                  2016 Completed             [facility]



                                 00:00:00                         [facility]

 

           HEPATITIS A            2011 Completed             [facility]



                                 00:00:00                         [facility]

 

           HPV                   2011 Completed             [facility]



                                 00:00:00                         [facility]

 

           Meningococcal            2011 Completed             [facility]



           Polysaccharide            00:00:00                         [facility]

[facility]



           (groups A, C, Y and                                             Branc

h



           W-135) conjugate                                             



           vaccine (MCV4P)                                             

 

           HEPATITIS A            2011 Completed             [facility]



                                 00:00:00                         [facility]

 

           HPV                   2011 Completed             [facility]



                                 00:00:00                         [facility]

 

           Meningococcal            2011 Completed             [facility]



           Polysaccharide            00:00:00                         [facility]

[facility]



           (groups A, C, Y and                                             Branc

h



           W-135) conjugate                                             



           vaccine (MCV4P)                                             

 

           HEPATITIS A            2011 Completed             [facility]



                                 00:00:00                         [facility]

 

           HPV                   2011 Completed             [facility]



                                 00:00:00                         [facility]

 

           Meningococcal            2011 Completed             [facility]



           Polysaccharide            00:00:00                         [facility]

[facility]



           (groups A, C, Y and                                             Branc

h



           W-135) conjugate                                             



           vaccine (MCV4P)                                             

 

           HEPATITIS A            2011 Completed             [facility]



                                 00:00:00                         [facility]



                                                                  Branch

 

           HPV                   2011 Completed             [facility]



                                 00:00:00                         [facility]

 

           Meningococcal            2011 Completed             [facility]



           Polysaccharide            00:00:00                         [facility]

[facility]



           (groups A, C, Y and                                             Branc

h



           W-135) conjugate                                             



           vaccine (MCV4P)                                             

 

           HEPATITIS A            2011 Completed             [facility]



                                 00:00:00                         [facility]



                                                                  Branch

 

           HPV                   2011 Completed             [facility]



                                 00:00:00                         [facility]

 

           Meningococcal            2011 Completed             [facility]



           Polysaccharide            00:00:00                         [facility]

[facility]



           (groups A, C, Y and                                             Branc

h



           W-135) conjugate                                             



           vaccine (MCV4P)                                             

 

           HEPATITIS A            2011 Completed             [facility]



                                 00:00:00                         [facility]



                                                                  Branch

 

           HPV                   2011 Completed             [facility]



                                 00:00:00                         [facility]

 

           Meningococcal            2011 Completed             [facility]



           Polysaccharide            00:00:00                         [facility]

[facility]



           (groups A, C, Y and                                             Branc

h



           W-135) conjugate                                             



           vaccine (MCV4P)                                             

 

           HEPATITIS A            2011 Completed             [facility]



                                 00:00:00                         [facility]

 

           HPV                   2011 Completed             [facility]



                                 00:00:00                         [facility]

 

           Meningococcal            2011 Completed             [facility]



           Polysaccharide            00:00:00                         [facility]

[facility]



           (groups A, C, Y and                                             Branc

h



           W-135) conjugate                                             



           vaccine (MCV4P)                                             

 

           HEPATITIS A            2011 Completed             [facility]



                                 00:00:00                         [facility]

 

           HPV                   2011 Completed             [facility]



                                 00:00:00                         [facility]

 

           Meningococcal            2011 Completed             [facility]



           Polysaccharide            00:00:00                         [facility]

[facility]



           (groups A, C, Y and                                             Branc

h



           W-135) conjugate                                             



           vaccine (MCV4P)                                             

 

           HEPATITIS A            2011 Completed             [facility]



                                 00:00:00                         [facility]

 

           HPV                   2011 Completed             [facility]



                                 00:00:00                         [facility]

 

           Meningococcal            2011 Completed             [facility]



           Polysaccharide            00:00:00                         [facility]

[facility]



           (groups A, C, Y and                                             Branc

h



           W-135) conjugate                                             



           vaccine (MCV4P)                                             

 

           HEPATITIS A            2011 Completed             [facility]



                                 00:00:00                         [facility]



                                                                  Branch

 

           HPV                   2011 Completed             [facility]



                                 00:00:00                         [facility]



                                                                  Branch

 

           Meningococcal            2011 Completed             [facility]



           Polysaccharide            00:00:00                         [facility]

[facility]



           (groups A, C, Y and                                             Branc

h



           W-135) conjugate                                             



           vaccine (MCV4P)                                             

 

           HEPATITIS A            2011 Completed             [facility]



                                 00:00:00                         [facility]

 

           HPV                   2011 Completed             [facility]



                                 00:00:00                         [facility]

 

           Meningococcal            2011 Completed             [facility]



           Polysaccharide            00:00:00                         [facility]

[facility]



           (groups A, C, Y and                                             Branc

h



           W-135) conjugate                                             



           vaccine (MCV4P)                                             

 

           HEPATITIS A            2011 Completed             [facility]



                                 00:00:00                         [facility]

 

           HPV                   2011 Completed             [facility]



                                 00:00:00                         [facility]

 

           Meningococcal            2011 Completed             [facility]



           Polysaccharide            00:00:00                         [facility]

[facility]



           (groups A, C, Y and                                             Branc

h



           W-135) conjugate                                             



           vaccine (MCV4P)                                             

 

           HEPATITIS A            2011 Completed             [facility]



                                 00:00:00                         [facility]

 

           HPV                   2011 Completed             [facility]



                                 00:00:00                         [facility]

 

           Meningococcal            2011 Completed             [facility]



           Polysaccharide            00:00:00                         [facility]

[facility]



           (groups A, C, Y and                                             Branc

h



           W-135) conjugate                                             



           vaccine (MCV4P)                                             

 

           HEPATITIS A            2011 Completed             [facility]



                                 00:00:00                         [facility]

 

           HPV                   2011 Completed             [facility]



                                 00:00:00                         [facility]

 

           Meningococcal            2011 Completed             [facility]



           Polysaccharide            00:00:00                         [facility]

[facility]



           (groups A, C, Y and                                             Branc

h



           W-135) conjugate                                             



           vaccine (MCV4P)                                             

 

           HEPATITIS A            2011 Completed             [facility]



                                 00:00:00                         [facility]

 

           HPV                   2011 Completed             [facility]



                                 00:00:00                         [facility]

 

           Meningococcal            2011 Completed             [facility]



           Polysaccharide            00:00:00                         [facility]

[facility]



           (groups A, C, Y and                                             Branc

h



           W-135) conjugate                                             



           vaccine (MCV4P)                                             

 

           HEPATITIS A            2011 Completed             [facility]



                                 00:00:00                         [facility]

 

           HPV                   2011 Completed             [facility]



                                 00:00:00                         [facility]

 

           Meningococcal            2011 Completed             [facility]



           Polysaccharide            00:00:00                         [facility]

[facility]



           (groups A, C, Y and                                             Branc

h



           W-135) conjugate                                             



           vaccine (MCV4P)                                             

 

           HEPATITIS A            2011 Completed             [facility]



                                 00:00:00                         [facility]

 

           HPV                   2011 Completed             [facility]



                                 00:00:00                         [facility]

 

           Meningococcal            2011 Completed             [facility]



           Polysaccharide            00:00:00                         [facility]

[facility]



           (groups A, C, Y and                                             Branc

h



           W-135) conjugate                                             



           vaccine (MCV4P)                                             

 

           HEPATITIS A            2011 Completed             [facility]



                                 00:00:00                         [facility]

 

           HPV                   2011 Completed             [facility]



                                 00:00:00                         [facility]

 

           Meningococcal            2011 Completed             [facility]



           Polysaccharide            00:00:00                         [facility]

[facility]



           (groups A, C, Y and                                             Branc

h



           W-135) conjugate                                             



           vaccine (MCV4P)                                             

 

           HEPATITIS A            2011 Completed             [facility]



                                 00:00:00                         [facility]

 

           HPV                   2011 Completed             [facility]



                                 00:00:00                         [facility]

 

           Meningococcal            2011 Completed             [facility]



           Polysaccharide            00:00:00                         [facility]

[facility]



           (groups A, C, Y and                                             Branc

h



           W-135) conjugate                                             



           vaccine (MCV4P)                                             

 

           HEPATITIS A            2011 Completed             [facility]



                                 00:00:00                         [facility]

 

           HPV                   2011 Completed             [facility]



                                 00:00:00                         [facility]

 

           Meningococcal            2011 Completed             [facility]



           Polysaccharide            00:00:00                         [facility]

[facility]



           (groups A, C, Y and                                             Branc

h



           W-135) conjugate                                             



           vaccine (MCV4P)                                             

 

           HPV                   2007 Completed             [facility]



                                 00:00:00                         [facility]

 

           Meningococcal            2007 Completed             [facility]



           Polysaccharide            00:00:00                         [facility]

[facility]



           (groups A, C, Y and                                             Branc

h



           W-135) conjugate                                             



           vaccine (MCV4P)                                             

 

           TDAP                  2007 Completed             [facility]



                                 00:00:00                         [facility]

 

           HPV                   2007 Completed             [facility]



                                 00:00:00                         [facility]

 

           Meningococcal            2007 Completed             [facility]



           Polysaccharide            00:00:00                         [facility]

[facility]



           (groups A, C, Y and                                             Branc

h



           W-135) conjugate                                             



           vaccine (MCV4P)                                             

 

           TDAP                  2007 Completed             [facility]



                                 00:00:00                         [facility]

 

           HPV                   2007 Completed             [facility]



                                 00:00:00                         [facility]

 

           Meningococcal            2007 Completed             [facility]



           Polysaccharide            00:00:00                         [facility]

[facility]



           (groups A, C, Y and                                             [facility]

h



           W-135) conjugate                                             



           vaccine (MCV4P)                                             

 

           TDAP                  2007 Completed             [facility]



                                 00:00:00                         [facility]

 

           HPV                   2007 Completed             [facility]



                                 00:00:00                         [facility]

 

           Meningococcal            2007 Completed             [facility] 

[facility]



           Polysaccharide            00:00:00                         [facility]

[facility]



           (groups A, C, Y and                                             [facility]

h



           W-135) conjugate                                             



           vaccine (MCV4P)                                             

 

           TDAP                  2007 Completed             [facility]



                                 00:00:00                         [facility]

 

           HPV                   2007 Completed             [facility]



                                 00:00:00                         [facility]

 

           Meningococcal            2007 Completed             [facility]



           Polysaccharide            00:00:00                         [facility]

[facility]



           (groups A, C, Y and                                             [facility]

h



           W-135) conjugate                                             



           vaccine (MCV4P)                                             

 

           TDAP                  2007 Completed             [facility]



                                 00:00:00                         [facility]

 

           HPV                   2007 Completed             [facility]



                                 00:00:00                         [facility]

 

           Meningococcal            2007 Completed             [facility]



           Polysaccharide            00:00:00                         [facility]

[facility]



           (groups A, C, Y and                                             [facility]

h



           W-135) conjugate                                             



           vaccine (MCV4P)                                             

 

           TDAP                  2007 Completed             [facility]



                                 00:00:00                         [facility]

 

           HPV                   2007 Completed             [facility]



                                 00:00:00                         [facility]

 

           Meningococcal            2007 Completed             [facility]



           Polysaccharide            00:00:00                         [facility]

[facility]



           (groups A, C, Y and                                             [facility]

h



           W-135) conjugate                                             



           vaccine (MCV4P)                                             

 

           TDAP                  2007 Completed             [facility]



                                 00:00:00                         [facility]

 

           HPV                   2007 Completed             [facility]



                                 00:00:00                         [facility]

 

           Meningococcal            2007 Completed             [facility]



           Polysaccharide            00:00:00                         [facility]

[facility]



           (groups A, C, Y and                                             [facility]

h



           W-135) conjugate                                             



           vaccine (MCV4P)                                             

 

           TDAP                  2007 Completed             [facility]



                                 00:00:00                         [facility]

 

           HPV                   2007 Completed             [facility]



                                 00:00:00                         [facility]

 

           Meningococcal            2007 Completed             [facility]



           Polysaccharide            00:00:00                         [facility]

[facility]



           (groups A, C, Y and                                             [facility]

h



           W-135) conjugate                                             



           vaccine (MCV4P)                                             

 

           TDAP                  2007 Completed             [facility]



                                 00:00:00                         [facility]

 

           HPV                   2007 Completed             [facility]



                                 00:00:00                         [facility]

 

           Meningococcal            2007 Completed             [facility]



           Polysaccharide            00:00:00                         [facility]

[facility]



           (groups A, C, Y and                                             [facility]

h



           W-135) conjugate                                             



           vaccine (MCV4P)                                             

 

           TDAP                  2007 Completed             [facility]



                                 00:00:00                         [facility]

 

           HPV                   2007 Completed             [facility]



                                 00:00:00                         [facility]

 

           Meningococcal            2007 Completed             [facility]



           Polysaccharide            00:00:00                         [facility]

[facility]



           (groups A, C, Y and                                             [facility]

h



           W-135) conjugate                                             



           vaccine (MCV4P)                                             

 

           TDAP                  2007 Completed             [facility]



                                 00:00:00                         [facility]



                                                                  [facility]

 

           HPV                   2007 Completed             [facility]



                                 00:00:00                         [facility]

 

           Meningococcal            2007 Completed             [facility]



           Polysaccharide            00:00:00                         [facility]

[facility]



           (groups A, C, Y and                                             [facility]

h



           W-135) conjugate                                             



           vaccine (MCV4P)                                             

 

           TDAP                  2007 Completed             [facility]



                                 00:00:00                         [facility]

 

           HPV                   2007 Completed             [facility]



                                 00:00:00                         [facility]

 

           Meningococcal            2007 Completed             [facility]



           Polysaccharide            00:00:00                         [facility]

[facility]



           (groups A, C, Y and                                             [facility]

h



           W-135) conjugate                                             



           vaccine (MCV4P)                                             

 

           TDAP                  2007 Completed             [facility]



                                 00:00:00                         [facility]

 

           HPV                   2007 Completed             [facility]



                                 00:00:00                         [facility]

 

           Meningococcal            2007 Completed             [facility]



           Polysaccharide            00:00:00                         [facility]

[facility]



           (groups A, C, Y and                                             [facility]

h



           W-135) conjugate                                             



           vaccine (MCV4P)                                             

 

           TDAP                  2007 Completed             [facility]



                                 00:00:00                         [facility]

 

           HPV                   2007 Completed             [facility]



                                 00:00:00                         [facility]

 

           Meningococcal            2007 Completed             [facility]



           Polysaccharide            00:00:00                         [facility]

[facility]



           (groups A, C, Y and                                             [facility]

h



           W-135) conjugate                                             



           vaccine (MCV4P)                                             

 

           TDAP                  2007 Completed             [facility]



                                 00:00:00                         [facility]

 

           HPV                   2007 Completed             [facility]



                                 00:00:00                         [facility]

 

           Meningococcal            2007 Completed             [facility]



           Polysaccharide            00:00:00                         [facility]

[facility]



           (groups A, C, Y and                                             [facility]

h



           W-135) conjugate                                             



           vaccine (MCV4P)                                             

 

           TDAP                  2007 Completed             [facility]



                                 00:00:00                         [facility]

 

           HPV                   2007 Completed             [facility]



                                 00:00:00                         [facility]

 

           Meningococcal            2007 Completed             [facility]



           Polysaccharide            00:00:00                         [facility]

[facility]



           (groups A, C, Y and                                             [facility]

h



           W-135) conjugate                                             



           vaccine (MCV4P)                                             

 

           TDAP                  2007 Completed             [facility]



                                 00:00:00                         [facility]

 

           HPV                   2007 Completed             [facility]



                                 00:00:00                         [facility]

 

           Meningococcal            2007 Completed             [facility]



           Polysaccharide            00:00:00                         [facility]

[facility]



           (groups A, C, Y and                                             [facility]

h



           W-135) conjugate                                             



           vaccine (MCV4P)                                             

 

           TDAP                  2007 Completed             [facility]



                                 00:00:00                         [facility]

 

           HPV                   2007 Completed             [facility]



                                 00:00:00                         [facility]

 

           Meningococcal            2007 Completed             [facility]



           Polysaccharide            00:00:00                         [facility]

[facility]



           (groups A, C, Y and                                             [facility]

h



           W-135) conjugate                                             



           vaccine (MCV4P)                                             

 

           TDAP                  2007 Completed             [facility]



                                 00:00:00                         [facility]

 

           HPV                   2007 Completed             [facility]



                                 00:00:00                         [facility]

 

           Meningococcal            2007 Completed             [facility]



           Polysaccharide            00:00:00                         [facility]

[facility]



           (groups A, C, Y and                                             [facility]

h



           W-135) conjugate                                             



           vaccine (MCV4P)                                             

 

           TDAP                  2007 Completed             [facility]



                                 00:00:00                         [facility]

 

           Varicella             2004 Completed             [facility]



           (varivax[facility]icken            00:00:00                         [facility]



           [facility])                                                   [facility]

 

           Varicella             2004 Completed             [facility]



           (varivax)(chicken            00:00:00                         [facility]



           p[facility])                                                   [facility]

 

           Varicella             2004 Completed             [facility]



           (varivax[facility]icken            00:00:00                         [facility]



           [facility])                                                   [facility]

 

           Varicella             2004 Completed             [facility]



           (varivax)(chicken            00:00:00                         [facility]



           [facility])                                                   [facility]

 

           Varicella             2004 Completed             [facility]



           (varivax)(chicken            00:00:00                         [facility]



           [facility])                                                   [facility]

 

           Varicella             2004 Completed             [facility]



           (varivax)(chicken            00:00:00                         [place] [facility]



           pox)                                                   [facility]

 

           Varicella             2004 Completed             [facility]



           (varivax)(chicken            00:00:00                         [place] [facility]



           pox)                                                   [facility]

 

           Varicella             2004 Completed             [facility]



           (varivax)(chicken            00:00:00                         [place] [facility]



           [facility])                                                   [facility]

 

           Varicella             2004 Completed             [facility]



           (varivax)(chicken            00:00:00                         [facility]



           [facility])                                                   [facility]

 

           Varicella             2004 Completed             [facility]



           (varivax)(chicken            00:00:00                         [place] [facility]



           pox)                                                   [facility]

 

           Varicella             2004 Completed             [facility]



           (varivax)(chicken            00:00:00                         [place] [facility]



           p[facility])                                                   [facility]

 

           Varicella             2004 Completed             [facility]



           (varivax)(chicken            00:00:00                         [place] [facility]



           [facility]ox)                                                   [facility]

 

           Varicella             2004 Completed             [facility]



           (varivax)(chicken            00:00:00                         [place] [facility]



           pox)                                                   [facility]

 

           Varicella             2004 Completed             [facility]



           (varivax)(chicken            00:00:00                         [place] [facility]



           pox)                                                   [facility]

 

           Varicella             2004 Completed             [facility]



           (varivax)(chicken            00:00:00                         [place] [facility]



           pox)                                                   [facility]

 

           Varicella             2004 Completed             [facility]



           (varivax)(chicken            00:00:00                         [place] [facility]



           pox)                                                   [facility]

 

           Varicella             2004 Completed             [facility]



           (varivax)(chicken            00:00:00                         [place] [facility]



           pox)                                                   [facility]

 

           Varicella             2004 Completed             [facility]



           (varivax)(chicken            00:00:00                         [place] [facility]



           pox)                                                   [facility]

 

           Varicella             2004 Completed             [facility]



           (varivax)(chicken            00:00:00                         [place] [facility]



           pox)                                                   [facility]

 

           Varicella             2004 Completed             [facility]



           (varivax)(chicken            00:00:00                         [place] [facility]



           pox)                                                   [facility]

 

           MMR                   1998 Completed             [facility]



                                 00:00:00                         [facility]

 

           MMR                   1998 Completed             [facility]



                                 00:00:00                         [facility]

 

           MMR                   1998 Completed             [facility]



                                 00:00:00                         [facility]

 

           MMR                   1998 Completed             [facility]



                                 00:00:00                         [facility]

 

           MMR                   1998 Completed             [facility]



                                 00:00:00                         [facility]

 

           MMR                   1998 Completed             [facility]



                                 00:00:00                         [facility]

 

           MMR                   1998 Completed             [facility]



                                 00:00:00                         [facility]

 

           MMR                   1998 Completed             [facility]



                                 00:00:00                         [facility]

 

           MMR                   1998 Completed             [facility]



                                 00:00:00                         [facility]

 

           MMR                   1998 Completed             [facility]



                                 00:00:00                         [facility]                   1998 Completed             [facility]



                                 00:00:00                         [facility]                   1998 Completed             [facility]



                                 00:00:00                         [facility]                   1998 Completed             [facility]



                                 00:00:00                         [facility]                   1998 Completed             [facility]



                                 00:00:00                         [facility]                   1998 Completed             [facility]



                                 00:00:00                         [facility]                   1998 Completed             [facility]



                                 00:00:00                         [facility]                   1998 Completed             [facility]



                                 00:00:00                         [facility]                   1998 Completed             [facility]



                                 00:00:00                         [facility]                   1998 Completed             [facility]



                                 00:00:00                         [facility]                   1998 Completed             [facility]



                                 00:00:00                         [facility]

 

           Varicella             1997-12-15 Completed             [facility]



           (varivax)(chicken            00:00:00                         [facility]



           pox)                                                   [facility]

 

           Varicella             1997-12-15 Completed             [facility]



           (varivax)(chicken            00:00:00                         [facility]



           pox)                                                   [facility]

 

           Varicella             1997-12-15 Completed             [facility]



           (varivax)(chicken            00:00:00                         [place] [facility]



           pox)                                                   [facility]

 

           Varicella             1997-12-15 Completed             [facility]



           (varivax)(chicken            00:00:00                         [place] [facility]



           pox)                                                   [facility]anch

 

           Varicella             1997-12-15 Completed             [facility]



           ([facility]iv[facility]icken            00:00:00                         [facility]



           pox)                                                   [facility]

 

           Varicella             1997-12-15 Completed             [facility]



           (varivax)(chicken            00:00:00                         [facility]



           pox)                                                   [facility]

 

           Varicella             1997-12-15 Completed             [facility]



           (varivax)(chicken            00:00:00                         [facility]



           pox)                                                   [facility]

 

           Varicella             1997-12-15 Completed             [facility]



           (varivax)(chicken            00:00:00                         [facility]



           pox)                                                   [facility]

 

           Varicella             1997-12-15 Completed             [facility]



           (varivax)(chicken            00:00:00                         [facility]



           pox)                                                   [facility]

 

           Varicella             1997-12-15 Completed             [facility]



           ([facility]ivax)(chicken            00:00:00                         [facility]



           [facility]ox)                                                   [facility]

 

           Varicella             1997-12-15 Completed             [facility]



           (varivax)(chicken            00:00:00                         [facility]



           pox)                                                   [facility]

 

           Varicella             1997-12-15 Completed             [facility]



           (varivax)(chicken            00:00:00                         [facility]



           pox)                                                   [facility]

 

           Varicella             1997-12-15 Completed             [facility]



           (varivax)(chicken            00:00:00                         [facility]



           pox)                                                   [facility]

 

           Varicella             1997-12-15 Completed             [facility]



           (var[facility]ax)(chicken            00:00:00                         [facility]



           [facility]ox)                                                   [facility]

 

           Varicella             1997-12-15 Completed             [facility]



           ([facility]ax)(chicken            00:00:00                         [facility]



           [facility])                                                   [facility]

 

           Varicella             1997-12-15 Completed             [facility]



           (var[facility]ax)(chicken            00:00:00                         [facility]



           [facility])                                                   [facility]

 

           Varicella             1997-12-15 Completed             [facility]



           (varivax)(chicken            00:00:00                         [facility]



           [facility]ox)                                                   [facility]

 

           Varicella             1997-12-15 Completed             [facility]



           (var[facility]ax)(chicken            00:00:00                         [facility]



           [facility]ox)                                                   [facility]

 

           Varicella             1997-12-15 Completed             [facility]



           (varivax)(chicken            00:00:00                         [facility]



           [facility])                                                   [facility]

 

           Varicella             1997-12-15 Completed             [facility]



           (varivax)(chicken            00:00:00                         [facility]



           [facility])                                                   [facility]

 

           IPV                   1997 Completed             [facility]



                                 00:00:00                         [facility]



                                                                  Branch

 

           DTAP                  1997 Completed             [facility]



                                 00:00:00                         [facility]



                                                                  Branch

 

           IPV                   1997 Completed             [facility]



                                 00:00:00                         [facility]



                                                                  [facility]

 

           DTAP                  1997 Completed             [facility]



                                 00:00:00                         [facility]



                                                                  [facility]

 

           IPV                   1997 Completed             [facility]



                                 00:00:00                         [facility]



                                                                  [facility]

 

           DTAP                  1997 Completed             [facility]



                                 00:00:00                         [facility]



                                                                  [facility]

 

           IPV                   1997 Completed             [facility]



                                 00:00:00                         [facility]



                                                                  [facility]

 

           DTAP                  1997 Completed             [facility]



                                 00:00:00                         [facility]



                                                                  [facility]

 

           IPV                   1997 Completed             [facility]



                                 00:00:00                         [facility]



                                                                  [facility]

 

           DTAP                  1997 Completed             [facility]



                                 00:00:00                         [facility]



                                                                  [facility]

 

           IPV                   1997 Completed             [facility]



                                 00:00:00                         [facility]



                                                                  [facility]

 

           DTAP                  1997 Completed             [facility]



                                 00:00:00                         [facility]



                                                                  [facility]

 

           IPV                   1997 Completed             [facility]



                                 00:00:00                         [facility]



                                                                  [facility]

 

           DTAP                  1997 Completed             [facility]



                                 00:00:00                         [facility]



                                                                  Branch

 

           IPV                   1997 Completed             [facility]



                                 00:00:00                         [facility]



                                                                  [facility]

 

           DTAP                  1997 Completed             [facility]



                                 00:00:00                         [facility]



                                                                  [facility]

 

           IPV                   1997 Completed             [facility]



                                 00:00:00                         [facility]



                                                                  [facility]

 

           DTAP                  1997 Completed             [facility]



                                 00:00:00                         [facility]



                                                                  [facility]

 

           IPV                   1997 Completed             [facility]



                                 00:00:00                         [facility]



                                                                  [facility]

 

           DTAP                  1997 Completed             [facility]



                                 00:00:00                         [facility]



                                                                  [facility]

 

           IPV                   1997 Completed             [facility]



                                 00:00:00                         [facility]



                                                                  [facility]

 

           DTAP                  1997 Completed             [facility]



                                 00:00:00                         [facility]



                                                                  [facility]

 

           IPV                   1997 Completed             [facility]



                                 00:00:00                         [facility]



                                                                  [facility]

 

           DTAP                  1997 Completed             [facility]



                                 00:00:00                         [facility]



                                                                  [facility]

 

           IPV                   1997 Completed             [facility]



                                 00:00:00                         [facility]



                                                                  [facility]

 

           DTAP                  1997 Completed             [facility]



                                 00:00:00                         [facility]



                                                                  [facility]

 

           IPV                   1997 Completed             [facility]



                                 00:00:00                         [facility]



                                                                  [facility]

 

           DTAP                  1997 Completed             [facility]



                                 00:00:00                         [facility]



                                                                  [facility]

 

           IPV                   1997 Completed             [facility]



                                 00:00:00                         [facility]



                                                                  [facility]

 

           DTAP                  1997 Completed             [facility]



                                 00:00:00                         [facility]



                                                                  [facility]

 

           IPV                   1997 Completed             [facility]



                                 00:00:00                         [facility]



                                                                  [facility]

 

           DTAP                  1997 Completed             [facility]



                                 00:00:00                         [facility]



                                                                  [facility]

 

           IPV                   1997 Completed             [facility]



                                 00:00:00                         [facility]



                                                                  [facility]

 

           DTAP                  1997 Completed             [facility]



                                 00:00:00                         [facility]



                                                                  [facility]

 

           IPV                   1997 Completed             [facility]



                                 00:00:00                         [facility]



                                                                  [facility]

 

           DTAP                  1997 Completed             [facility]



                                 00:00:00                         [facility]



                                                                  [facility]

 

           IPV                   1997 Completed             [facility]



                                 00:00:00                         [facility]



                                                                  [facility]

 

           DTAP                  1997 Completed             [facility]



                                 00:00:00                         [facility]



                                                                  [facility]

 

           IPV                   1997 Completed             [facility]



                                 00:00:00                         [facility]



                                                                  [facility]

 

           DTAP                  1997 Completed             [facility]



                                 00:00:00                         [facility]



                                                                  [facility]

 

           MMR                   1996 Completed             [facility]



                                 00:00:00                         [facility]



                                                                  [facility]

 

           MMR                   1996 Completed             [facility]



                                 00:00:00                         [facility]



                                                                  [facility]

 

           MMR                   1996 Completed             [facility]



                                 00:00:00                         [facility]



                                                                  [facility]

 

           MMR                   1996 Completed             [facility]



                                 00:00:00                         [facility]



                                                                  [facility]

 

           MMR                   1996 Completed             [facility]



                                 00:00:00                         [facility]



                                                                  [facility]

 

           MMR                   1996 Completed             [facility]



                                 00:00:00                         [facility]



                                                                  [facility]

 

           MMR                   1996 Completed             [facility]



                                 00:00:00                         [facility]



                                                                  [facility]

 

           MMR                   1996 Completed             [facility]



                                 00:00:00                         [facility]



                                                                  [facility]

 

           MMR                   1996 Completed             [facility]



                                 00:00:00                         [facility]



                                                                  [facility]

 

           MMR                   1996 Completed             [facility]



                                 00:00:00                         [facility]



                                                                  [facility]

 

           MMR                   1996 Completed             [facility]



                                 00:00:00                         [facility]



                                                                  [facility]

 

           MMR                   1996 Completed             [facility]



                                 00:00:00                         [facility]



                                                                  [facility]

 

           MMR                   1996 Completed             [facility]



                                 00:00:00                         [facility]



                                                                  [facility]

 

           MMR                   1996 Completed             [facility]



                                 00:00:00                         [facility]



                                                                  [facility]

 

           MMR                   1996 Completed             [facility]



                                 00:00:00                         [facility]



                                                                  [facility]

 

           MMR                   1996 Completed             [facility]



                                 00:00:00                         [facility]



                                                                  [facility]

 

           MMR                   1996 Completed             [facility]



                                 00:00:00                         [facility]



                                                                  [facility]

 

           MMR                   1996 Completed             [facility]



                                 00:00:00                         [facility]



                                                                  [facility]

 

           MMR                   1996 Completed             [facility]



                                 00:00:00                         [facility]



                                                                  [facility]

 

           MMR                   1996 Completed             [facility]



                                 00[facility]00:00                         [facility]



                                                                  [facility]

 

           MMR                   1995 Completed             [facility]



                                 00:00:00                         [facility]



                                                                  [facility]

 

           IPV                   1995 Completed             [facility]



                                 00:00:00                         [facility]



                                                                  [facility]

 

           HIB 3 Dose Schedule            1995 Completed             [facility]

[facility]



                                 00:00:00                         [facility]



                                                                  [facility]

 

           DTAP                  1995 Completed             [facility]



                                 00:00:00                         [facility]



                                                                  [facility]

 

           MMR                   1995 Completed             [facility]



                                 00:00:00                         [facility]



                                                                  [facility]

 

           IPV                   1995 Completed             [facility]



                                 00:00:00                         [facility]



                                                                  [facility]

 

           HIB 3 Dose Schedule            1995 Completed             [facility]

[facility]



                                 00:00:00                         [facility]



                                                                  [facility]

 

           DTAP                  1995 Completed             [facility]



                                 00:00:00                         [facility]



                                                                  [facility]

 

           MMR                   1995 Completed             [facility]



                                 00:00:00                         [facility]



                                                                  [facility]

 

           IPV                   1995 Completed             [facility]



                                 00:00:00                         [facility]



                                                                  [facility]

 

           HIB 3 Dose Schedule            1995 Completed             [facility]

[facility]



                                 00:00:00                         [facility]



                                                                  [facility]

 

           DTAP                  1995 Completed             [facility]



                                 00:00:00                         [facility]



                                                                  [facility]

 

           MMR                   1995 Completed             [facility]



                                 00:00:00                         [facility]



                                                                  [facility]

 

           IPV                   1995 Completed             [facility]



                                 00:00:00                         [facility]



                                                                  [facility]

 

           HIB 3 Dose Schedule            1995 Completed             [facility]

[facility]



                                 00:00:00                         [facility]



                                                                  [facility]

 

           DTAP                  1995 Completed             [facility]



                                 00:00:00                         [facility]



                                                                  [facility]

 

           MMR                   1995 Completed             [facility]



                                 00:00:00                         [facility]



                                                                  [facility]

 

           IPV                   1995 Completed             [facility]



                                 00:00:00                         [facility]



                                                                  [facility]

 

           HIB 3 Dose Schedule            1995 Completed             [facility]

[facility]



                                 00:00:00                         [facility]



                                                                  [facility]

 

           DTAP                  1995 Completed             [facility]



                                 00:00:00                         [facility]



                                                                  [facility]

 

           MMR                   1995 Completed             [facility]



                                 00:00:00                         [facility]



                                                                  [facility]

 

           IPV                   1995 Completed             [facility]



                                 00:00:00                         [facility]



                                                                  [facility]

 

           HIB 3 Dose Schedule            1995 Completed             [facility]

[facility]



                                 00:00:00                         [facility]



                                                                  [facility]

 

           DTAP                  1995 Completed             [facility]



                                 00:00:00                         [facility]



                                                                  [facility]

 

           MMR                   1995 Completed             [facility]



                                 00:00:00                         [facility]



                                                                  [facility]

 

           IPV                   1995 Completed             [facility]



                                 00:00:00                         [facility]



                                                                  [facility]

 

           HIB 3 Dose Schedule            1995 Completed             [facility]

[facility]



                                 00:00:00                         [facility]



                                                                  [facility]

 

           DTAP                  1995 Completed             [facility]



                                 00[facility]00[facility]00                         [facility]



                                                                  [facility]

 

           MMR                   1995 Completed             [facility]



                                 00:00[facility]00                         [facility]



                                                                  [facility]

 

           IPV                   1995 Completed             [facility]



                                 00:00:00                         [facility]



                                                                  [facility]

 

           HIB 3 Dose Schedule            1995 Completed             [facility]

[facility]



                                 00:00:00                         [facility]



                                                                  [facility]

 

           DTAP                  1995 Completed             [facility]



                                 00:00:00                         [facility]



                                                                  [facility]

 

           MMR                   1995 Completed             [facility]



                                 00:00:00                         [facility]

 

           IPV                   1995 Completed             [facility]



                                 00:00:00                         [facility]



                                                                  [facility]

 

           HIB 3 Dose Schedule            1995 Completed             [facility]

[facility]



                                 00:00:00                         [facility]



                                                                  [facility]

 

           DTAP                  1995 Completed             [facility]



                                 00:00[facility]00                         [facility]

 

           MMR                   1995 Completed             [facility]



                                 00:00[facility]00                         [facility]

 

           IPV                   1995 Completed             [facility]



                                 00:00:00                         [facility]

 

           HIB 3 Dose Schedule            1995 Completed             [facility]

[facility]



                                 00:00:00                         [facility]



                                                                  [facility]

 

           DTAP                  1995 Completed             [facility]



                                 00:00[facility]00                         [facility]

 

           MMR                   1995 Completed             [facility]



                                 00:00:00                         [facility]

 

           IPV                   1995 Completed             [facility]



                                 00:00[facility]00                         [facility]



                                                                  [facility]

 

           HIB 3 Dose Schedule            1995 Completed             [facility]

[facility]



                                 00:00:00                         [facility]



                                                                  [facility]

 

           DTAP                  1995 Completed             [facility]



                                 00:00:00                         [facility]

 

           MMR                   1995 Completed             [facility]



                                 00:00[facility]00                         [facility]

 

           IPV                   1995 Completed             [facility]



                                 00:00:00                         [facility]

 

           HIB 3 Dose Schedule            1995 Completed             [facility]

[facility]



                                 00:00:00                         [facility]

 

           DTAP                  1995 Completed             [facility]



                                 00:00:00                         [facility]

 

           MMR                   1995 Completed             [facility]



                                 00:00:00                         [facility]

 

           IPV                   1995 Completed             [facility]



                                 00:00:00                         [facility]

 

           HIB 3 Dose Schedule            1995 Completed             [facility]

[facility]



                                 00:00:00                         [facility]

 

           DTAP                  1995 Completed             [facility]



                                 00:00:00                         [facility]

 

           MMR                   1995 Completed             [facility]



                                 00:00:00                         [facility]

 

           IPV                   1995 Completed             [facility]



                                 00:00:00                         [facility]

 

           HIB 3 Dose Schedule            1995 Completed             [facility]

[facility]



                                 00:00:00                         [facility]

 

           DTAP                  1995 Completed             [facility]



                                 00:00:00                         [facility]

 

           MMR                   1995 Completed             [facility]



                                 00:00:00                         [facility]

 

           IPV                   1995 Completed             [facility]



                                 00:00:00                         [facility]

 

           HIB 3 Dose Schedule            1995 Completed             [facility]

[facility]



                                 00:00:00                         [facility]

 

           DTAP                  1995 Completed             [facility]



                                 00:00:00                         [facility]

 

           MMR                   1995 Completed             [facility]



                                 00:00:00                         [facility]

 

           IPV                   1995 Completed             [facility]



                                 00:00:00                         [facility]

 

           HIB 3 Dose Schedule            1995 Completed             [facility]

[facility]



                                 00:00:00                         [facility]

 

           DTAP                  1995 Completed             [facility]



                                 00:00:00                         [facility]

 

           MMR                   1995 Completed             [facility]



                                 00:00:00                         [facility]

 

           IPV                   1995 Completed             [facility]



                                 00:00:00                         [facility]

 

           HIB 3 Dose Schedule            1995 Completed             [facility]

[facility]



                                 00:00:00                         [facility]

 

           DTAP                  1995 Completed             [facility]



                                 00:00:00                         [facility]

 

           MMR                   1995 Completed             [facility]



                                 00:00:00                         [facility]

 

           IPV                   1995 Completed             [facility]



                                 00:00:00                         [facility]

 

           HIB 3 Dose Schedule            1995 Completed             [facility]

[facility]



                                 00:00:00                         [facility]

 

           DTAP                  1995 Completed             [facility]



                                 00:00:00                         [facility]

 

           MMR                   1995 Completed             [facility]



                                 00:00:00                         [facility]

 

           IPV                   1995 Completed             [facility]



                                 00:00:00                         [facility]

 

           HIB 3 Dose Schedule            1995 Completed             [facility]

[facility]



                                 00:00:00                         [facility]

 

           DTAP                  1995 Completed             [facility]



                                 00:00:00                         [facility]

 

           MMR                   1995 Completed             [facility]



                                 00:00:00                         [facility]

 

           IPV                   1995 Completed             [facility]



                                 00:00:00                         [facility]

 

           HIB 3 Dose Schedule            1995 Completed             [facility]

[facility]



                                 00:00:00                         [facility]

 

           DTAP                  1995 Completed             [facility]



                                 00:00:00                         [facility]



                                                                  [facility]

 

           Hep B, Adol or Pedi            1994 Completed             [facility]

[facility]



           Dosage                00:00:00                         [facility]



                                                                  [facility]

 

           IPV                   1994 Completed             [facility]



                                 00:00:00                         [facility]

 

           DTAP                  1994 Completed             [facility]



                                 00:00:00                         [facility]

 

           Hep B, Adol or Pedi            1994 Completed             [facility]

[facility]



           Dosage                00:00:00                         [facility]

 

           IPV                   1994 Completed             [facility]



                                 00:00:00                         [facility]

 

           DTAP                  1994 Completed             [facility]



                                 00:00:00                         [facility]

 

           Hep B, Adol or Pedi            1994 Completed             [facility]

[facility]



           Dosage                00:00:00                         [facility]



                                                                  [facility]

 

           IPV                   1994 Completed             [facility]



                                 00:00:00                         [facility]

 

           DTAP                  1994 Completed             [facility]



                                 00:00:00                         [facility]

 

           Hep B, Adol or Pedi            1994 Completed             [facility]

[facility]



           Dosage                00:00:00                         [facility]



                                                                  [facility]

 

           IPV                   1994 Completed             [facility]



                                 00:00:00                         [facility]



                                                                  [facility]

 

           DTAP                  1994 Completed             [facility]



                                 00:00:00                         [facility]



                                                                  [facility]

 

           Hep B, Adol or Pedi            1994 Completed             [facility]

[facility]



           Dosage                00:00:00                         [facility]

 

           IPV                   1994 Completed             [facility]



                                 00:00:00                         [facility]

 

           DTAP                  1994 Completed             [facility]



                                 00:00:00                         [facility]



                                                                  [facility]

 

           Hep B, Adol or Pedi            1994 Completed             [facility]

[facility]



           Dosage                00:00:00                         [facility]



                                                                  [facility]

 

           IPV                   1994 Completed             [facility]



                                 00:00:00                         [facility]



                                                                  [facility]

 

           DTAP                  1994 Completed             [facility]



                                 00:00:00                         [facility]



                                                                  [facility]

 

           Hep B, Adol or Pedi            1994 Completed             [facility]

[facility]



           Dosage                00:00:00                         [facility]



                                                                  [facility]

 

           IPV                   1994 Completed             [facility]



                                 00:00:00                         [facility]

 

           DTAP                  1994 Completed             [facility]



                                 00:00:00                         [facility]



                                                                  [facility]

 

           Hep B, Adol or Pedi            1994 Completed             [facility]

[facility]



           Dosage                00:00:00                         [facility]



                                                                  [facility]

 

           IPV                   1994 Completed             [facility]



                                 00:00:00                         [facility]



                                                                  [facility]

 

           DTAP                  1994 Completed             [facility]



                                 00:00:00                         [facility]



                                                                  [facility]

 

           Hep B, Adol or Pedi            1994 Completed             [facility]

[facility]



           Dosage                00:00:00                         [facility]

 

           IPV                   1994 Completed             [facility]



                                 00:00:00                         [facility]

 

           DTAP                  1994 Completed             [facility]



                                 00:00:00                         [facility]



                                                                  [facility]

 

           Hep B, Adol or Pedi            1994 Completed             [facility]

[facility]



           Dosage                00:00:00                         [facility]



                                                                  [facility]

 

           IPV                   1994 Completed             [facility]



                                 00:00:00                         [facility]



                                                                  [facility]

 

           DTAP                  1994 Completed             [facility]



                                 00:00:00                         [facility]



                                                                  [facility]

 

           Hep B, Adol or Pedi            1994 Completed             [facility]

[facility]



           Dosage                00:00:00                         [facility]



                                                                  [facility]

 

           IPV                   1994 Completed             [facility]



                                 00:00:00                         [facility]



                                                                  [facility]

 

           DTAP                  1994 Completed             [facility]



                                 00:00:00                         [facility]



                                                                  [facility]

 

           Hep B, Adol or Pedi            1994 Completed             [facility]

[facility]



           Dosage                00:00:00                         [facility]



                                                                  [facility]

 

           IPV                   1994 Completed             [facility]



                                 00:00:00                         [facility]



                                                                  [facility]

 

           DTAP                  1994 Completed             [facility]



                                 00:00:00                         [facility]



                                                                  [facility]

 

           Hep B, Adol or Pedi            1994 Completed             [facility]

[facility]



           Dosage                00:00:00                         [facility]



                                                                  [facility]

 

           IPV                   1994 Completed             [facility]



                                 00:00:00                         [facility]



                                                                  [facility]

 

           DTAP                  1994 Completed             [facility]



                                 00:00:00                         [facility]



                                                                  [facility]

 

           Hep B, Adol or Pedi            1994 Completed             [facility]

[facility]



           Dosage                00:00:00                         [facility]



                                                                  [facility]

 

           IPV                   1994 Completed             [facility]



                                 00:00:00                         [facility]



                                                                  [facility]

 

           DTAP                  1994 Completed             [facility]



                                 00:00:00                         [facility]



                                                                  [facility]

 

           Hep B, Adol or Pedi            1994 Completed             [facility]

[facility]



           Dosage                00:00:00                         [facility]

 

           IPV                   1994 Completed             [facility]



                                 00:00:00                         [facility]



                                                                  [facility]

 

           DTAP                  1994 Completed             [facility]



                                 00:00:00                         [facility]



                                                                  [facility]

 

           Hep B, Adol or Pedi            1994 Completed             [facility]

[facility] of



           Dosage                00:00:00                         [facility]



                                                                  Branch

 

           IPV                   1994 Completed             [facility]



                                 00:00:00                         [facility]



                                                                  Branch

 

           DTAP                  1994 Completed             [facility]



                                 00:00:00                         [facility]



                                                                  Branch

 

           Hep B, Adol or Pedi            1994 Completed             [facility]

[facility]ity of



           Dosage                00:00:00                         [facility]

 

           IPV                   1994 Completed             [facility]



                                 00:00:00                         [facility]



                                                                  Branch

 

           DTAP                  1994 Completed             [facility]



                                 00:00:00                         [facility]



                                                                  Branch

 

           Hep B, Adol or Pedi            1994 Completed             [facility]

[facility] of



           Dosage                00:00:00                         [facility]



                                                                  [facility]

 

           IPV                   1994 Completed             [facility]



                                 00:00:00                         [facility]



                                                                  [facility]

 

           DTAP                  1994 Completed             [facility]



                                 00:00:00                         [facility]



                                                                  [facility]

 

           Hep B, Adol or Pedi            1994 Completed             [facility]

[facility] of



           Dosage                00:00:00                         [facility]



                                                                  [facility]

 

           IPV                   1994 Completed             [facility]



                                 00:00:00                         [facility]



                                                                  Branch

 

           DTAP                  1994 Completed             [facility]



                                 00:00:00                         [facility]



                                                                  [facility]

 

           Hep B, Adol or Pedi            1994 Completed             [facility]

[facility] of



           Dosage                00:00:00                         [facility]



                                                                  [facility]

 

           IPV                   1994 Completed             [facility]



                                 00:00:00                         [facility]



                                                                  [facility]

 

           DTAP                  1994 Completed             [facility]



                                 00:00:00                         [facility]



                                                                  [facility]

 

           Hep B, Adol or Pedi            1993 Completed             [facility]

[facility] of



           Dosage                00:00:00                         [facility]



                                                                  [facility]

 

           IPV                   1993 Completed             [facility]



                                 00:00:00                         [facility]



                                                                  [facility]

 

           HIB 3 Dose Schedule            1993 Completed             [facility]

[facility]



                                 00:00:00                         [facility]



                                                                  [facility]

 

           DTAP                  1993 Completed             [facility]



                                 00:00:00                         [facility]



                                                                  [facility]

 

           Hep B, Adol or Pedi            1993 Completed             [facility]

[facility] of



           Dosage                00:00:00                         [facility]



                                                                  [facility]

 

           IPV                   1993 Completed             [facility]



                                 00:00:00                         [facility]



                                                                  [facility]

 

           HIB 3 Dose Schedule            1993 Completed             [facility]

[facility] of



                                 00:00:00                         [facility]



                                                                  [facility]

 

           DTAP                  1993 Completed             [facility]



                                 00:00:00                         [facility]



                                                                  [facility]

 

           Hep B, Adol or Pedi            1993 Completed             [facility]

[facility] of



           Dosage                00:00:00                         [facility]



                                                                  [facility]

 

           IPV                   1993 Completed             [facility]



                                 00:00:00                         [facility]



                                                                  [facility]

 

           HIB 3 Dose Schedule            1993 Completed             [facility]

[facility] of



                                 00:00:00                         [facility]



                                                                  [facility]

 

           DTAP                  1993 Completed             [facility]



                                 00:00:00                         [facility]



                                                                  [facility]

 

           Hep B, Adol or Pedi            1993 Completed             [facility]

[facility] of



           Dosage                00:00:00                         [facility]



                                                                  [facility]

 

           IPV                   1993 Completed             [facility]



                                 00:00:00                         [facility]



                                                                  [facility]

 

           HIB 3 Dose Schedule            1993 Completed             [facility]

[facility]



                                 00:00:00                         [facility]



                                                                  [facility]

 

           DTAP                  1993 Completed             [facility]



                                 00:00:00                         [facility]



                                                                  [facility]

 

           Hep B, Adol or Pedi            1993 Completed             [facility]

[facility] of



           Dosage                00:00:00                         [facility]



                                                                  [facility]

 

           IPV                   1993 Completed             [facility]



                                 00:00:00                         [facility]



                                                                  [facility]

 

           HIB 3 Dose Schedule            1993 Completed             [facility]

[facility] of



                                 00:00:00                         [facility]



                                                                  [facility]

 

           DTAP                  1993 Completed             [facility]



                                 00:00:00                         [facility]



                                                                  [facility]

 

           Hep B, Adol or Pedi            1993 Completed             [facility]

[facility] of



           Dosage                00:00:00                         [facility]



                                                                  [facility]

 

           IPV                   1993 Completed             [facility]



                                 00:00:00                         [facility]



                                                                  [facility]

 

           HIB 3 Dose Schedule            1993 Completed             [facility]

[facility] of



                                 00:00:00                         [facility]



                                                                  [facility]

 

           DTAP                  1993 Completed             [facility]



                                 00:00:00                         [facility]



                                                                  [facility]

 

           Hep B, Adol or Pedi            1993 Completed             [facility]

[facility] of



           Dosage                00:00:00                         [facility]



                                                                  [facility]

 

           IPV                   1993 Completed             [facility]



                                 00:00:00                         [facility]



                                                                  [facility]

 

           HIB 3 Dose Schedule            1993 Completed             [facility]

[facility]



                                 00:00:00                         [facility]



                                                                  [facility]

 

           DTAP                  1993 Completed             [facility]



                                 00:00:00                         [facility]



                                                                  [facility]

 

           Hep B, Adol or Pedi            1993 Completed             [facility]

[facility] of



           Dosage                00:00:00                         [facility]



                                                                  [facility]

 

           IPV                   1993 Completed             [facility]



                                 00:00:00                         [facility]



                                                                  [facility]

 

           HIB 3 Dose Schedule            1993 Completed             [facility]

[facility]



                                 00:00:00                         [facility]



                                                                  [facility]

 

           DTAP                  1993 Completed             [facility]



                                 00:00:00                         [facility]



                                                                  [facility]

 

           Hep B, Adol or Pedi            1993 Completed             [facility]

[facility] of



           Dosage                00:00:00                         [facility]



                                                                  [facility]

 

           IPV                   1993 Completed             [facility]



                                 00:00:00                         [facility]



                                                                  [facility]

 

           HIB 3 Dose Schedule            1993 Completed             [facility]

[facility]



                                 00:00:00                         [facility]



                                                                  [facility]

 

           DTAP                  1993 Completed             [facility]



                                 00:00:00                         [facility]



                                                                  [facility]

 

           Hep B, Adol or Pedi            1993 Completed             [facility]

[facility] of



           Dosage                00:00:00                         [facility]



                                                                  [facility]

 

           IPV                   1993 Completed             [facility]



                                 00:00:00                         [facility]



                                                                  [facility]

 

           HIB 3 Dose Schedule            1993 Completed             [facility]

[facility]



                                 00:00:00                         [facility]



                                                                  [facility]

 

           DTAP                  1993 Completed             [facility]



                                 00:00:00                         [facility]



                                                                  [facility]

 

           Hep B, Adol or Pedi            1993 Completed             [facility]

[facility] of



           Dosage                00:00:00                         [facility]



                                                                  [facility]

 

           IPV                   1993 Completed             [facility]



                                 00:00:00                         [facility]



                                                                  [facility]

 

           HIB 3 Dose Schedule            1993 Completed             [facility]

[facility]



                                 00:00:00                         [facility]



                                                                  [facility]

 

           DTAP                  1993 Completed             [facility]



                                 00:00:00                         [facility]



                                                                  [facility]

 

           Hep B, Adol or Pedi            1993 Completed             [facility]

[facility] of



           Dosage                00:00:00                         [facility]



                                                                  [facility]

 

           IPV                   1993 Completed             [facility]



                                 00:00:00                         [facility]



                                                                  [facility]

 

           HIB 3 Dose Schedule            1993 Completed             [facility]

[facility]



                                 00:00:00                         [facility]



                                                                  [facility]

 

           DTAP                  1993 Completed             [facility]



                                 00:00:00                         [facility]



                                                                  [facility]

 

           Hep B, Adol or Pedi            1993 Completed             [facility]

[facility] of



           Dosage                00:00:00                         [facility]



                                                                  [facility]

 

           IPV                   1993 Completed             [facility]



                                 00:00:00                         [facility]



                                                                  [facility]

 

           HIB 3 Dose Schedule            1993 Completed             [facility]

[facility]



                                 00:00:00                         [facility]



                                                                  [facility]

 

           DTAP                  1993 Completed             [facility]



                                 00:00:00                         [facility]



                                                                  [facility]

 

           Hep B, Adol or Pedi            1993 Completed             [facility]

[facility]



           Dosage                00:00:00                         [facility]



                                                                  [facility]

 

           IPV                   1993 Completed             [facility]



                                 00:00:00                         [facility]



                                                                  [facility]

 

           HIB 3 Dose Schedule            1993 Completed             [facility]

[facility]



                                 00:00:00                         [facility]



                                                                  [facility]

 

           DTAP                  1993 Completed             [facility]



                                 00:00:00                         [facility]



                                                                  [facility]

 

           Hep B, Adol or Pedi            1993 Completed             [facility]

[facility] of



           Dosage                00:00:00                         [facility]



                                                                  [facility]

 

           IPV                   1993 Completed             [facility]



                                 00:00:00                         [facility]



                                                                  [facility]

 

           HIB 3 Dose Schedule            1993 Completed             [facility]

[facility]



                                 00:00:00                         [facility]



                                                                  [facility]

 

           DTAP                  1993 Completed             [facility]



                                 00:00:00                         [facility]



                                                                  [facility]

 

           Hep B, Adol or Pedi            1993 Completed             [facility]

[facility] of



           Dosage                00:00:00                         [facility]



                                                                  [facility]

 

           IPV                   1993 Completed             [facility]



                                 00:00:00                         [facility]



                                                                  [facility]

 

           HIB 3 Dose Schedule            1993 Completed             [facility]

[facility]



                                 00:00:00                         [facility]



                                                                  [facility]

 

           DTAP                  1993 Completed             [facility]



                                 00:00:00                         [facility]



                                                                  [facility]

 

           Hep B, Adol or Pedi            1993 Completed             [facility]

[facility] of



           Dosage                00:00:00                         [facility]



                                                                  [facility]

 

           IPV                   1993 Completed             [facility]



                                 00:00:00                         [facility]



                                                                  [facility]

 

           HIB 3 Dose Schedule            1993 Completed             [facility]

[facility]



                                 00:00:00                         [facility]



                                                                  [facility]

 

           DTAP                  1993 Completed             [facility]



                                 00:00:00                         [facility]



                                                                  [facility]

 

           Hep B, Adol or Pedi            1993 Completed             [facility]

[facility] of



           Dosage                00:00:00                         [facility]



                                                                  [facility]

 

           IPV                   1993 Completed             [facility]



                                 00:00:00                         [facility]



                                                                  [facility]

 

           HIB 3 Dose Schedule            1993 Completed             [facility]

[facility]



                                 00:00:00                         [facility]



                                                                  [facility]

 

           DTAP                  1993 Completed             [facility]



                                 00:00:00                         [facility]



                                                                  [facility]

 

           Hep B, Adol or Pedi            1993 Completed             [facility]

[facility] of



           Dosage                00:00:00                         [facility]



                                                                  [facility]

 

           IPV                   1993 Completed             [facility]



                                 00:00:00                         [facility]



                                                                  [facility]

 

           HIB 3 Dose Schedule            1993 Completed             [facility]

[facility] of



                                 00:00:00                         [facility]



                                                                  [facility]

 

           DTAP                  1993 Completed             [facility]



                                 00:00:00                         [facility]



                                                                  [facility]

 

           Hep B, Adol or Pedi            1993 Completed             [facility]

[facility] of



           Dosage                00:00:00                         [facility]



                                                                  [facility]

 

           IPV                   1993 Completed             [facility]



                                 00:00:00                         [facility]



                                                                  [facility]

 

           HIB 3 Dose Schedule            1993 Completed             [facility]

[facility]



                                 00:00:00                         [facility]



                                                                  [facility]

 

           DTAP                  1993 Completed             [facility]



                                 00:00:00                         [facility]



                                                                  [facility]

 

           Hep B, Adol or Pedi            1993 Completed             [facility]

[facility] of



           Dosage                00:00:00                         [facility]



                                                                  [facility]

 

           DTAP                  1993 Completed             [facility]



                                 00:00:00                         [facility]



                                                                  [facility]

 

           Hep B, Adol or Pedi            1993 Completed             [facility]

[facility] of



           Dosage                00:00:00                         [facility]



                                                                  [facility]

 

           DTAP                  1993 Completed             [facility]



                                 00:00:00                         [facility]



                                                                  [facility]

 

           Hep B, Adol or Pedi            1993 Completed             [facility]

[facility] of



           Dosage                00:00:00                         [facility]



                                                                  [facility]

 

           DTAP                  1993 Completed             [facility]



                                 00:00:00                         [facility]



                                                                  [facility]

 

           Hep B, Adol or Pedi            1993 Completed             [facility]

[facility]



           Dosage                00:00:00                         [facility]



                                                                  [facility]

 

           DTAP                  1993 Completed             [facility]



                                 00:00:00                         [facility]



                                                                  [facility]

 

           Hep B, Adol or Pedi            1993 Completed             [facility]

[facility]



           Dosage                00:00:00                         [facility]



                                                                  [facility]

 

           DTAP                  1993 Completed             [facility]



                                 00:00:00                         [facility]



                                                                  [facility]

 

           Hep B, Adol or Pedi            1993 Completed             [facility]

[facility]



           Dosage                00:00:00                         [facility]



                                                                  [facility]

 

           DTAP                  1993 Completed             [facility]



                                 00:00:00                         [facility]



                                                                  [facility]

 

           Hep B, Adol or Pedi            1993 Completed             [facility]

[facility]



           Dosage                00:00:00                         [facility]



                                                                  [facility]

 

           DTAP                  1993 Completed             [facility]



                                 00:00:00                         [facility]



                                                                  [facility]

 

           Hep B, Adol or Pedi            1993 Completed             [facility]

[facility]



           Dosage                00:00:00                         [facility]



                                                                  [facility]

 

           DTAP                  1993 Completed             [facility]



                                 00:00:00                         [facility]



                                                                  [facility]

 

           Hep B, Adol or Pedi            1993 Completed             [facility]

[facility]



           Dosage                00:00:00                         [facility]



                                                                  [facility]

 

           DTAP                  1993 Completed             [facility]



                                 00:00:00                         [facility]



                                                                  [facility]

 

           Hep B, Adol or Pedi            1993 Completed             [facility]

[facility] of



           Dosage                00:00:00                         [facility]

 

           DTAP                  1993 Completed             [facility]



                                 00:00:00                         [facility]



                                                                  [facility]

 

           Hep B, Adol or Pedi            1993 Completed             [facility]

[facility] of



           Dosage                00:00:00                         [facility]



                                                                  [facility]

 

           DTAP                  1993 Completed             [facility]



                                 00:00:00                         [facility]



                                                                  [facility]

 

           Hep B, Adol or Pedi            1993 Completed             [facility]

[facility] of



           Dosage                00:00:00                         [facility]



                                                                  [facility]

 

           DTAP                  1993 Completed             [facility]



                                 00:00:00                         [facility]



                                                                  [facility]

 

           Hep B, Adol or Pedi            1993 Completed             [facility]

[facility] of



           Dosage                00:00:00                         [facility]



                                                                  [facility]

 

           DTAP                  1993 Completed             [facility]



                                 00:00:00                         [facility]



                                                                  [facility]

 

           Hep B, Adol or Pedi            1993 Completed             [facility]

[facility] of



           Dosage                00:00:00                         [facility]



                                                                  [facility]

 

           DTAP                  1993 Completed             [facility]



                                 00:00:00                         [facility]



                                                                  [facility]

 

           Hep B, Adol or Pedi            1993 Completed             [facility]

[facility] of



           Dosage                00:00:00                         [facility]



                                                                  [facility]

 

           DTAP                  1993 Completed             [facility]



                                 00:00:00                         [facility]



                                                                  [facility]

 

           Hep B, Adol or Pedi            1993 Completed             [facility]

[facility] of



           Dosage                00:00:00                         [facility]



                                                                  [facility]

 

           DTAP                  1993 Completed             [facility]



                                 00:00:00                         [facility]



                                                                  [facility]

 

           Hep B, Adol or Pedi            1993 Completed             [facility]

[facility] of



           Dosage                00:00:00                         [facility]

 

           DTAP                  1993 Completed             [facility]



                                 00:00:00                         [facility]

 

           Hep B, Adol or Pedi            1993 Completed             [facility]

rsity of



           Dosage                00:00:00                         [facility]

 

           DTAP                  1993 Completed             [facility]



                                 00:00:00                         [facility]

 

           Hep B, Adol or Pedi            1993 Completed             [facility]

[facility] of



           Dosage                00:00:00                         [facility]

 

           DTAP                  1993 Completed             [facility]



                                 00:00:00                         [facility]

 

           Hep B, Adol or Pedi            1993 Completed             [facility]

[facility]



           [facility]                [facility]                  [facility]             [facility]



                                 00:00:00                         [facility]







Vital Signs







             Vital Name   Observation Time Observation Value Comments     Source

 

             Systolic blood 2023 16:21:00 95 mm[Hg]                 [facility]

[facility]



             pressure                                            [facility]

 

             Diastolic blood 2023 16:21:00 66 mm[Hg]                 [facility]

[facility]



             pressure                                            [facility]

 

             Heart rate   2023 16:21:00 110 /min                  [facility]

 

             Body temperature 2023 16:21:00 36.44 Anjali                 [facility]

 

             Respiratory rate 2023 16:21:00 18 /min                   [facility]

 

             Body height  2023 16:21:00 157.5 cm                  [facility]

 

             Body weight  2023 16:21:00 65.772 kg                 [facility]

 

             BMI          2023 16:21:00 26.52 kg/m2               [facility]

 

             Oxygen saturation in 2023 16:21:00 98 /min                   

[facility]



             Arterial blood by                                        [facility]

[facility]



             Pulse oximetry                                        Branch

 

             Systolic blood 2023 16:10:00 106 mm[Hg]                [facility]

[facility]



             pressure                                            [facility]

 

             Diastolic blood 2023 16:10:00 75 mm[Hg]                 [facility]

[facility] of



             pressure                                            [facility]

 

             Heart rate   2023 16:10:00 93 /min                   [facility]

[facility]

 

             Body temperature 2023 16:10:00 36.78 Anjali                 [facility]

ers[facility] of



                                                                 [facility]



                                                                 [facility]

 

             Body height  2023 16:10:00 160 cm                    [facility]

[facility]



                                                                 [facility]



                                                                 [facility]

 

             Body weight  2023 16:10:00 64.592 kg                 [facility]

[facility]



                                                                 [facility]



                                                                 [facility]

 

             BMI          2023 16:10:00 25.23 kg/m2               [facility]

[facility]



                                                                 [facility]



                                                                 [facility]

 

             Systolic blood 2023 16:35:00 110 mm[Hg]                [facility]

[facility] of



             pressure                                            [facility]



                                                                 [facility]

 

             Diastolic blood 2023 16:35:00 75 mm[Hg]                 [facility]

[facility] of



             pressure                                            [facility]



                                                                 [facility]

 

             Heart rate   2023 16:35:00 75 /min                   [facility]

[facility]



                                                                 [facility]



                                                                 [facility]

 

             Body temperature 2023 16:35:00 37.06 Anjali                 [facility]

[facility] of



                                                                 [facility]



                                                                 [facility]

 

             Body height  2023 16:35:00 160 cm                    [facility]

[facility]



                                                                 [facility]



                                                                 [facility]

 

             Body weight  2023 16:35:00 64.955 kg                 [facility]

[facility]



                                                                 [facility]



                                                                 [facility]

 

             BMI          2023 16:35:00 25.37 kg/m2               [facility]

[facility]



                                                                 [facility]



                                                                 [facility]

 

             Systolic blood 2023 19:30:00 109 mm[Hg]                [facility]

[facility] of



             pressure                                            [facility]



                                                                 [facility]

 

             Diastolic blood 2023 19:30:00 73 mm[Hg]                 [facility]

[facility] of



             pressure                                            [facility]



                                                                 [facility]

 

             Heart rate   2023 19:30:00 82 /min                   [facility]

[facility]



                                                                 [facility]



                                                                 [facility]

 

             Body temperature 2023 19:30:00 36.67 Anjali                 [facility]

[facility] of



                                                                 [facility]



                                                                 [facility]

 

             Body height  2023 19:30:00 160 cm                    [facility]

[facility]



                                                                 [facility]



                                                                 [facility]

 

             Body weight  2023 19:30:00 65.772 kg                 [facility]

[facility]



                                                                 [facility]



                                                                 [facility]

 

             BMI          2023 19:30:00 25.69 kg/m2               [facility]

[facility]



                                                                 [facility]



                                                                 [facility]

 

             Systolic blood 2022-12-10 16:11:00 108 mm[Hg]                [facility]

[facility]



             pressure                                            [facility]



                                                                 [facility]

 

             Diastolic blood 2022-12-10 16:11:00 71 mm[Hg]                 [facility]

[facility]



             pressure                                            [facility]



                                                                 [facility]

 

             Heart rate   2022-12-10 16:11:00 72 /min                   [facility]

[facility]



                                                                 [facility]



                                                                 [facility]

 

             Body temperature 2022-12-10 16:11:00 36.94 Anjali                 [facility]

ers[facility] of



                                                                 [facility]



                                                                 [facility]

 

             Respiratory rate 2022-12-10 16:11:00 16 /min                   [facility]

[facility]



                                                                 [facility]



                                                                 [facility]

 

             Body height  2022-12-10 16:11:00 160 cm                    [facility]

[facility]



                                                                 [facility]



                                                                 [facility]

 

             Body weight  2022-12-10 16:11:00 64.411 kg                 [facility]

[facility]



                                                                 [facility]



                                                                 [facility]

 

             BMI          2022-12-10 16:11:00 25.15 kg/m2               [facility]

[facility] of



                                                                 [facility]



                                                                 [facility]

 

             Oxygen saturation in 2022-12-10 16:11:00 99 /min                   

[facility]



             Arterial blood by                                        [facility]

[facility]



             Pulse oximetry                                        [facility]anch

 

             Systolic blood 2022 15:32:00 106 mm[Hg]                [facility]

[facility] of



             pressure                                            [facility]



                                                                 [facility]

 

             Diastolic blood 2022 15:32:00 75 mm[Hg]                 [facility]

[facility] of



             pressure                                            [facility]



                                                                 [place]

 

             Heart rate   2022 15:32:00 88 /min                   [facility]

[facility]



                                                                 [facility]



                                                                 [facility]

 

             Body temperature 2022 15:32:00 36.94 Anjali                 [facility]

ers[facility] of



                                                                 [facility]



                                                                 [facility]

 

             Respiratory rate 2022 15:32:00 17 /min                   [facility]

[facility] of



                                                                 [facility]



                                                                 [facility]

 

             Body height  2022 15:32:00 160 cm                    [facility]

[facility]



                                                                 [facility]



                                                                 [facility]

 

             Body weight  2022 15:32:00 64.411 kg                 [facility]

[facility]



                                                                 [facility]



                                                                 [facility]

 

             BMI          2022 15:32:00 25.15 kg/m2               [facility]

[facility]



                                                                 [facility]



                                                                 [facility]

 

             Systolic blood 2022 19:45:00 96 mm[Hg]                 [facility]

[facility] of



             pressure                                            [facility]



                                                                 [facility]

 

             Diastolic blood 2022 19:45:00 63 mm[Hg]                 [facility]

[facility] of



             pressure                                            [facility]



                                                                 [facility]

 

             Heart rate   2022 19:45:00 73 /min                   [facility]

[facility]



                                                                 [facility]



                                                                 [facility]

 

             Body temperature 2022 19:45:00 37.22 Anjali                 [facility]

[facility] of



                                                                 [facility]



                                                                 [facility]

 

             Body height  2022 19:45:00 160 cm                    [facility]

[facility]



                                                                 [facility]



                                                                 [facility]

 

             Body weight  2022 19:45:00 62.506 kg                 [facility]

[facility]



                                                                 [facility]



                                                                 [facility]

 

             BMI          2022 19:45:00 24.41 kg/m2               [facility]

[facility] of



                                                                 [facility]



                                                                 [facility]

 

             Oxygen saturation in 2022 19:45:00 100 /min                  

[facility]



             Arterial blood by                                        [facility]

[facility]



             Pulse oximetry                                        Branch

 

             Systolic blood 2022 18:28:00 117 mm[Hg]                [facility]

[facility] of



             pressure                                            [facility]



                                                                 [facility]

 

             Diastolic blood 2022 18:28:00 81 mm[Hg]                 [facility]

[facility] of



             pressure                                            [facility]



                                                                 [facility]

 

             Heart rate   2022 18:28:00 94 /min                   [facility]

[facility]



                                                                 [facility]



                                                                 [facility]

 

             Body temperature 2022 18:28:00 36.5 Najali                  [facility]iv

ers[facility] of



                                                                 [facility]



                                                                 [facility]

 

             Respiratory rate 2022 18:28:00 18 /min                   [facility]

[facility] of



                                                                 [facility]



                                                                 [facility]

 

             Body height  2022 18:28:00 157.5 cm                  [facility]

[facility]



                                                                 [facility]



                                                                 [facility]

 

             Body weight  2022 18:28:00 62.234 kg                 [facility]

[facility]



                                                                 [facility]



                                                                 [facility]

 

             BMI          2022 18:28:00 25.09 kg/m2               [facility]

[facility]



                                                                 [facility]



                                                                 [facility]

 

             Systolic blood 2022 21:11:00 118 mm[Hg]                [facility]

[facility] of



             pressure                                            [facility]



                                                                 [facility]

 

             Diastolic blood 2022 21:11:00 78 mm[Hg]                 [facility]

[facility] of



             pressure                                            [facility]



                                                                 [facility]

 

             Heart rate   2022 21:11:00 88 /min                   [facility]

[facility]



                                                                 [facility]



                                                                 [facility]

 

             Body temperature 2022 21:11:00 36.78 Anjali                 [facility]

[facility] of



                                                                 [facility]



                                                                 [facility]

 

             Body height  2022 21:11:00 160 cm                    [facility]

[facility]



                                                                 [facility]



                                                                 [facility]

 

             Body weight  2022 21:11:00 62.596 kg                 [facility]

[facility]



                                                                 [facility]



                                                                 [facility]

 

             BMI          2022 21:11:00 24.45 kg/m2               [facility]

[facility]



                                                                 [facility]



                                                                 [facility]

 

             Oxygen saturation in 2022 21:11:00 99 /min                   

[facility]



             Arterial blood by                                        [place] [facility]

mihaela



             Pulse oximetry                                        Branch







Procedures







                Procedure       Date / Time     Performing Clinician Source



                                Performed                       

 

                POCT URINALYSIS W/O 2023 00:00:00 Emmanuelle Weeks   [facility]

[facility] [place]



                SPECIFIC GRAVITY                                 [facility]

 

                POCT URINALYSIS W/O 2023 00:00:00 Emmanuelle Weeks   [facility]



                SPECIFIC GRAVITY                                 [facility] [place]

 

                SCANNED LAB RESULTS 2023 05:01:00 Doctor Unassigned, No [facility]

[facility]

 

                POCT URINALYSIS W/O 2023 00:00:00 Emmanuelle Weeks   [facility]



                SPECIFIC GRAVITY                                 [facility] [place]

 

                US OB TRANSVAGINAL 2023 20:44:12 Emmanuelle Weeks   [facility]

y [facility] PATIENT FINANCIAL 2023 19:14:10 Doctor Unassigned, No

 [facility]

 

                POCT PREGNANCY TEST 2023 00:00:00 Emmanuelle Weeks   [facility]

 

                POCT URINALYSIS W/O 2023 00:00:00 Emmanuelle Weeks   [facility]

 

                BI US GUIDED CORE 2023 16:26:00 José Miguel Weeks        [facility]



                BREAST BIOPSY RIGHT                                 Medical [facility]

ch

 

                BI ULTRASOUND BREAST 2023 16:39:45 Emmanuelle Weeks   [facility] RIGHT                                  [facility]

 

                CONSENT/REFUSAL FOR 2023 14:38:50 Doctor Unassigned, No Un

[facility] 

[place]



                DIAGNOSIS AND TREATMENT                 [facility]

 

                ASSIGNMENT OF BENEFITS 2023 14:38:21 Doctor Unassigned, No

 [facility]

 

                ASSIGNMENT OF BENEFITS 2022 19:12:05 Doctor Unassigned, No

 [facility]







Encounters







        Start   End     Encounter Admission Attending Care    Care    Encounter 

Source



        Date/Time Date/Time Type    Type    Clinicians Facility Department ID   

   

 

        2023 Outpatient P               [facility]    [id number]

[id number] Univers



        13:30:00 13:30:00                                                 [facility]

 

        [facility]23 Outpatient R       EMMANUELLE WEEKS [facility]    [id number]

[id number] Univers



        11:15:00 11:55:35                                                 [facility]

 

        2023 Routine         Emmanuelle Weeks [facility]    1.2.186.846 [id number]

[id number] Univers



        11:15:00 11:55:35 Prenatal         Cam     [place] 350.1.13.10         

ity of



                        Visit                   [facility] 4.2.7.2.686         [place]

[place]



                                                PROFESSIO [phone]         Me

dical



                                                NAL     134             [facility]                 

 

        2023 Technician         2, Adc Lab [facility]    1.2.840.114 

[id number] Univers



        10:15:00 10:30:00 Visit           Emmanuelle WeeksTON 350.1.13.10    

     ity of



                                                [facility] 4.2.7.2.686         [place]

[place]



                                                PROFESSIO [phone]         Me

dical



                                                MALDONADO     [address] [place] [address]                 

 

        2023 Outpatient R       MICKY [facility]    [id number]

[id number] Univers



        10:15:00 10:15:00                                                 ity of



                                                                        [facility]

 

        2023 Outpatient R       MICKY [facility]    [id number]

[id number] Univers



        11:15:00 11:45:50                                                 ity of



                                                                        [facility]

 

        2023 Routine         Micky [facility]    1.2.719.568 [id number]

[id number] Univers



        11:15:00 11:45:50 Prenatal         Cam     [place] 350.1.13.10         

ity of



                        Visit                   [place] 4.2.7.2.686         [place]

[place]



                                                PROFESSIO [phone]         Me

dical



                                                NAL     [address] [place] [address]                 

 

        2023 Telephone         Micky [facility]    1.2.840.114 [id number]

[id number] Univers



        00:00:00 00:00:00                 Cam     [place] 350.1.13.10         i

ty of



                                                [facility] 4.2.7.2.686         [place]

[place]



                                                PROFESSIO [phone]         Me

dical



                                                NAL     134             [facility]                 

 

        2023 Technician         Ralph, Adc Lab Main [facility]    1.2.8

40.114 [id number] 

Univers



        12:00:00 12:15:00 Visit           Emmanuelle WeeksTON 350.1.13.10    

     ity of



                                                [facility] 4.2.7.2.686         [place]

[place]



                                                PROFESSIO [phone]         Me

dical



                                                NAL     353             [facility]                 

 

        2023 Outpatient R       EMMANUELLE WEEKS [facility]    [id number]

[id number] Univers



        11:00:00 12:05:54                                                 ity of



                                                                        [facility]

 

        2023 Routine         Emmanuelle Weeks [facility]    1.2.844.833 [id number]

[id number] Univers



        11:00:00 12:05:54 Prenatal         Cam     [place] 350.1.13.10         

ity of



                        Visit                   [place] 4.2.7.2.686         [place]

[place]



                                                PROFESSIO [phone]         Me

dical



                                                NAL     [address] [place] [address]                 

 

        2023 Orders          Doctor  LEA    1.2.840.114 [id number]

[id number] Univers



        00:00:00 00:00:00 Only            Unassigned, AMAN   350.1.13.10       

  ity of



                                        [facility] 4.2.7.2.686         Andres

as



                                                        [phone]         [address]

 

        2023-04-10 2023-04-10 Telephone         Emmanuelle Weeks [facility]    1.2.840.114 [id number]

[id number] Univers



        00:00:00 00:00:00                 Cam     [place] 350.1.13.10         i

ty of



                                                [place] 4.2.7.2.686         [place]

[place]



                                                PROFESSIO [phone]         Me

dical



                                                NAL     [address] [place] [address]                 

 

        2023 Outpatient R       EMMANUELLE WEEKS [facility]    [id number]

[id number] Univers



        14:30:00 15:41:24                                                 ity of



                                                                        [facility]

 

        2023 Initial         Emmanuelle Weeks [facility]    1.2.488.653 [id number]

[id number] Univers



        14:30:00 15:41:24 Prenatal         Cam     [place] 350.1.13.10         

ity of



                        Visit                   [place] 4.2.7.2.686         [place]

[place]



                                                PROFESSIO [phone]         Me

dical



                                                NAL     [address] [place] [address]                 

 

        2023 Orders          Doctor  LEA    1.2.840.114 [id number]

[id number] Univers



        00:00:00 00:00:00 Only            Unassigned, AMAN   350.1.13.10       

  ity of



                                        [facility] 4.2.7.2.686         Andres

as



                                                        [phone]         [address]

 

        2023 Telephone         Emmanuelle Weeks [facility]    1.2.840.114 [id number]

[id number] Univers



        00:00:00 00:00:00                 Cam     [place] 350.1.13.10         i

ty of



                                                [place] 4.2.7.2.686         [place]

[place]



                                                PROFESSIO [phone]         Me

dical



                                                NAL     [address] [place] [address]                 

 

        2023 Telephone         Emmanuelle Weeks [facility]    1.2.840.114 [id number]

[id number] Univers



        00:00:00 00:00:00                 Cam     [place] 350.1.13.10         i

ty of



                                                [place] 4.2.7.2.686         [place]

[place]



                                                PROFESSIO [phone]         Me

dical



                                                NAL     [address] [place] [address]                 

 

        2023 Outpatient R       RADIOLOGY [facility]    [id number]

[id number] Univers



        09:10:48 23:59:00                                                 ity of



                                                                        [facility]

 

        2023 Hospital         Radiology [facility]    1.2.840.114 [id number]

[id number] Univers



        09:10:48 23:59:00 Encounter                 [place] 350.1.13.10        

 ity of



                                                [place] 4.2.7.2.686         [place]

[place]



                                                CAMPUS  [phone]         Medi

mihaela



                                                        [address] [place] [address]

 

        2023 Case            Emmanuelle Weeks [facility]    1.2.525.663 [id number]

[id number] Univers



        00:00:00 00:00:00 Management         Cam     [place] 350.1.13.10       

  ity of



                                                [place] 4.2.7.2.686         [place]

[place]



                                                PROFESSIO [phone]         Me

dical



                                                NAL     [address] [place] [address]                 

 

        2023 Outpatient R       EMMANUELLE WEEKS [facility]    [id number]

[id number] Univers



        08:39:24 23:59:00                                                 ity of



                                                                        [facility]

 

        2023 [facility]         Emmaunelle Weeks [facility]    1.2.840.114 [id number]

[id number] Univers



        08:30:00 23:59:00 Encounter         Cam     [place] 350.1.13.10        

 ity of



                                                [place] 4.2.7.2.686         [place]

[place]



                                                CAMPUS  [phone]         [address]

 

        2022 Telephone         Michelle [facility]    1.2.840.114 [id number]

[id number] Univers



        00:00:00 00:00:00                 Rachael   GABRIEL 350.1.13.10         i

ty of



                                                [facility] 4.2.7.2.686         [place]

[place]



                                                PROFESSIO [phone]         [facility]     134             [facility]                 

 

        2022-12-10 2022-12-10 Outpatient R       MICHELLE [facility]    [id number]

[id number] Univers



        10:20:00 10:27:57                 RACHAEL martinez [facility]

 

        2022-12-10 2022-12-10 Urgent          Ismaellauren [facility]    ..840.11

[id number] Univers



        10:20:00 10:27:57 Care            Unknown, Attending HEALTH  350.1.13.10

         ity of



                                                [place] 4.2.7.2.686         Andres

as



                                                ANDRES?BLEA [phone]         [facility]    370             [facility]                 



                                                OFFICE                  



                                                [facility]                 

 

        2022 Outpatient R       EMMANUELLE WEEKS [facility]    [id number]

[id number] Univers



        09:30:00 09:53:47                                                 ity [facility]

 

        2022 Office          Emmanuelle Weeks [facility]    1.2.659.972 [id number]

[id number] Univers



        09:30:00 09:53:47 Visit           Abimael RIOS 350.1.13.10         i

ty of



                                                [facility] 4.2.7.2.686         [place]

[place]



                                                PROFESSIO [phone]         [address]                 

 

        2022 Office          [facility]    1.2.840.114 [id number]

52 Univers



        13:00:00 13:30:00 Visit           Joyce A [facility]  350.1.13.10         i

ty of



                                                [place] 4.2.7.2.686         Andres

as



                                                ANDRES?BLEA [phone]         [facility]    044             [facility]                 



                                                OFFICE                  



                                                [facility]                 

 

        2022 Outpatient R       [facility]    [id number]

[id number] Univers



        13:00:00 13:00:00                 JOYCE martinez [facility]

 

        2022 Orders          Doctor [facility]    1.2.840.114 [id number]

13 Univers



        00:00:00 00:00:00 Only            Unassigned, AMAN   350.1.13.10       

  ity of



                                        [facility] 4.2.7.2.686         Andres

as



                                                        [phone]         [address]

 

        2022 Outpatient R       MICHELLE [facility]    [id number]

[id number] Univers



        13:30:00 13:54:50                 RACHAEL                           ity [facility]

 

        2022 Office          [facility]    1.2.277.598 [id number]

1214 Univers



        13:30:00 13:54:50 Visit           Rachaeljesika RIOS 350.1.13.10         i

ty of



                                                [place] 4.2.7.2.686         [place]

[place]



                                                PROFESSIO [phone]         Me

dical



                                                [facility]     134             [facility]                 

 

        2022 Office          [facility]    1.2.840.114 [id number]

52 Univers



        16:30:00 16:30:30 Visit           [facility]  350.1.13.10         it

y of



                                                [place] 4.2.7.2.686         Andres

as



                                                ANDRES?BLEA [phone]         Me

dical



                                                EY    044             [facility]                 



                                                OFFICE                  



                                                [facility]                 

 

        2022 Outpatient R       ALBERTO [facility]    [id number]

[id number] Univers



        16:30:00 16:30:30                 ETIENNE                            ity [facility]

 

        2022 Outpatient R       ALBERTO [facility]    [id number]

084 Univers



        16:30:00 16:30:00                 ETIENNE                            ity [facility]

 

        2022 Outpatient R               [facility]    [id number]

396 Univers



        09:00:00 09:00:00                                                 ity [facility]

 

        2022 Outpatient                 [facility]     [id number]

80 [facility]



        00:00:00 00:00:00                                                 Health

 

        2022 Outpatient                 [facility]     [id number]

92 [place]



        00:00:00 00:00:00                                                 Health

 

        2022 Outpatient R       EMMANUELLE WEEKS [facility]    [id number]

[id number] Univers



        09:00:00 09:11:45                                                 ity of



                                                                        [facility]

 

        2022 Nurse           Nurse, [facility]    

1.2.840.114 [id number]

                                        Univers



        09:00:00 09:11:45 Visit           Micky Emmanuelle Abimael RIOS 350.1.13.10    

     ity of



                                                [place] 4.2.7.2.686         [place]

[place]



                                                PROFESSIO [phone]         [address]                 

 

        2022 Outpatient R       MICKY [facility]    [id number]

[id number] Univers



        13:00:00 14:10:33                                                 ity of



                                                                        [facility]

 

        2022 Office          Micky [facility]    1.2.980.855 [id number]

[id number] Univers



        13:00:00 14:10:33 Visit           Abimael RIOS 350.1.13.10         i

ty of



                                                [place] 4.2.7.2.686         [place]

[place]



                                                PROFESSIO [phone]         [address]                 

 

        2022 Outpatient R       MICKY [facility]    [id number]

[id number] Univers



        13:00:00 14:10:33                                                 ity of



                                                                        [facility]

 

        2022 Orders          Doctor  LEA    1.2.840.114 [id number]

11 Univers



        00:00:00 00:00:00 Only            Unassigned, AMAN   350.1.13.10       

  ity of



                                        [facility] 4.2.7.2.686         Andres

as



                                                        [phone]         [address]

 

        2022 Telephone         Micky [facility]    1.2.840.114 [id number]

[id number] Univers



        00:00:00 00:00:00                 Abimael RIOS 350.1.13.10         i

ty of



                                                [place] 4.2.7.2.686         [place]

[place]



                                                PROFESSIO [phone]         [address]                 

 

        2022 Outpatient R       MICKY [facility]    [id number]

[id number] Univers



        09:00:00 09:00:00                                                 ity of



                                                                        [facility]

 

        2021 Alex Jorgensen [facility]    1.2.840.114 [id number]

145 Univers



        00:00:00 00:00:00                 Wondiful A HEALTH  350.1.13.10        

 ity of



                                                [place] 4.2.7.2.686         Andres

as



                                                ANDRES?BLEA [phone]         Me

dical



                                                EY    044             [place]



                                                MEDICAL                 



                                                OFFICE                  



                                                [facility]                 

 

        2021 Office          Emmanuelle Weeks Abimael [facility]    1.2.840.114 

[id number] Univers



        09:31:17 10:17:22 Visit           Rachael Martinez 350.1.13.10  

       ity of



                                                [place] 4.2.7.2.686         [place]

[place]



                                                PROFESSIO [phone]         Me

dical



                                                [address]                 

 

        2021 Outpatient LUL MARTINEZ [facility]    [id number]

[id number] Univers



        09:30:00 10:17:22                 [facility]EL                           [facility]

 

        [facility]21 Outpatient R       MICHELLE [facility]    [id number]

[id number] Univers



        09:30:00 10:17:22                 RACHAEL martinez [facility]

 

        2021 Outpatient R       MICHELLE [facility]    [id number]

[id number] Univers



        09:30:00 09:30:00                 RACHAEL                           [facility]

 

        2021 Outpatient R       MICHELLE [facility]    [id number]

[id number] Univers



        09:30:00 09:30:00                 [facility]

 

        2021 Orders          Doctor [facility]    1.2.840.114 [id number]

80 Univers



        00:00:00 00:00:00 Only            Unassigned, AMAN   350.1.13.10       

  ity of



                                        [facility] 4.2.7.2.686         Andres

as



                                                        [phone]         [address]

 

        2021 Outpatient LUL JORGENSEN [facility]    [id number]

8621 Univers



        10:30:00 10:30:00                 WONDIFUL                         ity o

f



                                                                        [facility]

 

        2021 Outpatient LUL JORGENSEN [facility]    [id number]

8621 Univers



        10:30:00 10:30:00                 WONDIFUL                         ity o

f



                                                                        [facility]

 

        2021-11-10 2021-11-10 Telephone         [facility]    1.2.840.114 [id number]

[id number] Univers



        00:00:00 00:00:00                 Rachael   GABRIEL 350.1.13.10         i

ty of



                                                [facility] 4.2.7.2.686         [place]

[place]



                                                PROFESSIO [phone]         [address]                 

 

        2021 Case            Micky [facility]    1.2.573.472 [id number]

[id number] Univers



        00:00:00 00:00:00 Management         Abimael RIOS 350.1.13.10       

  ity of



                                                [facility] [id number]         [place]

[place]



                                                PROFESSIO [phone]         [address]                 

 

        2021 Outpatient R       MICKY [facility]    [id number]

[id number] Univers



        14:00:00 15:36:59                                                 ity of



                                                                        [facility]

 

        2021 Office          Micky [facility]    [id number]

[id number] Univers



        13:49:34 15:36:59 Visit           Abimael RIOS [id number]         i

ty of



                                                [place] [id number]         [place]

[place]



                                                PROFESS[place] [phone]         [address]                 

 

        2021 Outpatient R       MICKY EMMANUELLE [facility]    [id number]

[id number] Univers



        14:00:00 14:00:00                                                 ity of



                                                                        [facility]

 

        2021 Telephone         Micky [facility]    [id number]

[id number] Univers



        00:00:00 00:00:00                 Abimael RIOS [id number]         i

ty of



                                                [place] [id number]         [place]

[place]



                                                PROFESSIO [phone]         [address]                 

 

        2021 Outpatient R       STEFFANY [facility]    [id number]

[id number] Univers



        15:00:00 15:00:00                 SENDIL                          ity [facility]

 

        2021 Outpatient R       STEFFANY [facility]    [id number]

[id number] Univers



        15:00:00 15:00:00                 SENDIL                          ity [facility]

 

        2021-10-12 2021-10-12 Office          Conrad [facility]    [id number]

[id number] Univers



        14:44:33 15:57:45 Visit           [facility]  [id number]        

 ity [facility] [id number]         Andres

as



                                                Andres?Blea [phone]         Me

dical



                                                [address]



                                                Medical                 



                                                Office                  



                                                Building                 

 

        2021-10-12 2021-10-12 [facility]       [facility]    [id number]

[id number] Univers



        15:00:00 15:00:00                 WONDIFUL                         ity o

f



                                                                        [facility]

 

        2021 Outpatient R               [facility]    [id number]

[id number] Univers



        15:00:00 15:00:00                                                 ity [facility]

 

        2021 Outpatient R               [facility]    [id number]

[id number] Univers



        10:00:00 10:00:00                                                 ity of



                                                                        [facility]

 

        2021 Outpatient R               [facility]    [id number]

[id number] Univers



        10:30:00 10:30:00                                                 ity [facility]

 

        2021 Outpatient R               [facility]    [id number]

[id number] Univers



        09:00:00 09:00:00                                                 ity [facility]

 

        2021 Outpatient R               [facility]    [id number]

[id number] Univers



        15:00:00 15:00:00                                                 ity of



                                                                        [facility]

 

        2021 Outpatient R               [facility]    [id number]

[id number] Univers



        10:00:00 10:00:00                                                 ity of



                                                                        [facility]

 

        2021 Outpatient R               [facility]    [id number]

[id number] Univers



        09:00:00 09:00:00                                                 ity [facility]

 

        2021 Outpatient R               [facility]    [id number]

[id number] Univers



        08:00[facility]00 08[facility]00:00                                                 [facility]

 

        [facility] Out[facility] R               [facility]    [id number]

[id number] Univers



        09:00:00 09:00:00                                                 [facility]

 

        2021 Outpatient R       EMMANUELLE WEEKS [facility]    [id number]

[id number] Univers



        13:30:00 13:30:00                                                 [facility]

 

        2020 Case            [facility]    [id number]

[id number] 



        00:00:00 00:00:00 Management         Rachael Rios [id number]       

  



                                                [place] [id number]         



                                                Professio [phone]         



                                                [address]                 

 

        2020 Telephone         [facility]    [id number]

[id number] 



        00:00:00 00:00:00                 Rachael Rios [id number]         



                                                [place] [id number]         



                                                Professio [phone]         



                                                [address]                 

 

        2020 Outpatient R       JOSÉ MIGUELBRITNEY [facility]    [id number]

[id number] Univers



        08:00:00 08:00:00                 RACHAEL                           [facility]

 

        2020 Outpatient EMMANUELLE PATEL [facility]    [id number]

[id number] Univers



        10:00:00 10:00:00                                                 [facility]

 

        2020 Outpatient R               [facility]    [id number]

[id number] Univers



        10:00:00 10:00:00                                                 [facility]

 

        2020 Outpatient R       CONRAD [facility]    [id number]

[id number] Univers



        16:00:00 16:00:00                 WONDIFUL                         ity o

f



                                                                        [facility]

 

        2020 Outpatient R       CONRAD [facility]    [id number]

[id number] Univers



        16:15:00 16:15:00                 WONDIFUL                         ity o

f



                                                                        [facility]

 

        2020 Outpatient LUL       EMMANUELLE WEEKS [facility]    [id number]

[id number] Univers



        10:00:00 10:00:00                                                 [facility]

 

        [facility] Outpatient R       CONRAD [facility]    [id number]

[id number] Univers



        11:15:00 11:15:00                 WONDIFUL                         ity o

f



                                                                        [facility]







Results







           Test Description Test Time  Test Comments Results    Result Comments 

Source









                    POCT URINALYSIS W/O SPECIFIC GRAVITY 2023 16:18:00 









                      Test Item  Value      Reference Range Interpretation Comme

nts









             POCT PH U (test code = 3254) n/a          5-8                      

 

 

             POCT U LEUK EST (test code = 3263) n/a          Negative - Negative

              

 

             POCT U NIT (test code = 3262) n/a          Negative - Negative     

         

 

             POCT U PROT (test code = 3259) negative     Negative - Negative    

          

 

             POCT U GLU (test code = 3256) negative     Negative - Negative     

         

 

             POCT U KETONE (test code = 3258) n/a          Negative - Negative  

            

 

             POCT U BLD (test code = 3257) n/a          Negative - Negative     

         



[facility] [id number]
 16:08:00





             Test Item    Value        Reference Range Interpretation Comments

 

             POCT PH U (test code = 3254) n/a          5-8                      

 

 

             POCT U LEUK EST (test code = n/a          Negative - Negative      

        



             3263)                                               

 

             POCT U NIT (test code = 3262) n/a          Negative - Negative     

         

 

             POCT U PROT (test code = 3259) Negative     Negative - Negative    

          

 

             POCT U GLU (test code = 3256) Normal       Negative - Negative     

         

 

             POCT U KETONE (test code = 3258) n/a          Negative - Negative  

            

 

             POCT U BLD (test code = 3257) n/a          Negative - Negative     

         



[facility] [id number]
 16:32:00





             Test Item    Value        Reference Range Interpretation Comments

 

             POCT PH U (test code = 3254) n/a          5-8                      

 

 

             POCT U LEUK EST (test code = 3263) n/a          Negative - Negative

              

 

             POCT U NIT (test code = 3262) n/a          Negative - Negative     

         

 

             POCT U PROT (test code = 3259) Trace        Negative - Negative    

          

 

             POCT U GLU (test code = 3256) Normal       Negative - Negative     

         

 

             POCT U KETONE (test code = 3258) n/a          Negative - Negative  

            

 

             POCT U BLD (test code = 3257) n/a          Negative - Negative     

         



[facility] [id number] 19:31:00





             Test Item    Value        Reference Range Interpretation Comments

 

             POCT PREG (test code = 1605) Positive                              

 

 

             On board controls acceptable with C Yes                            

        



             Line (test code = 3574)                                        

 

             POCT PREG LOT # (test code = 3575)                                 

       

 

             POCT PREG TEST  DATE (test                                   

     



             code = 3576)                                        



[facility] SPECIFIC [id number]
 19:30:00





             Test Item    Value        Reference Range Interpretation Comments

 

             POCT PH U (test code = 3254) n/a          5-8                      

 

 

             POCT U LEUK EST (test code = n/a          Negative - Negative      

        



             3)                                               

 

             POCT U NIT (test code = 3262) n/a          Negative - Negative     

         

 

             POCT U PROT (test code = 3259) Negative     Negative - Negative    

          

 

             POCT U GLU (test code = 3256) Normal       Negative - Negative     

         

 

             POCT U KETONE (test code = 3258) n/a          Negative - Negative  

            

 

             POCT U BLD (test code = 3257) n/a          Negative - Negative     

         



[facility]

## 2023-06-21 NOTE — RAD REPORT
EXAM DESCRIPTION:  RAD - Shoulder  Left 2 View - 6/21/2023 1:12 pm

 

CLINICAL HISTORY:  PAIN

 

COMPARISON:  No comparisons

 

TECHNIQUE:  Internal and external rotation views of the left shoulder were obtained.

 

FINDINGS:  There is no fracture or dislocation. AC joint is normal in appearance. No acute or suspici
ous findings. Linear focus of calcification adjacent to the greater tuberosity, may be present along 
the rotator cuff insertion.

 

IMPRESSION:  Linear calcification possibly along the rotator cuff insertion. Findings could relate to
 healing traumatic changes or calcific tendinitis.

## 2023-06-21 NOTE — ER
Nurse's Notes                                                                                     

 [facility]                                                                 

Name: Nickie Keenan                                                                             

Age: 28 yrs                                                                                       

Sex: Female                                                                                       

: 1995                                                                                   

MRN: [id number]                                                                                   

Arrival Date: 2023                                                                          

Time: 12:04                                                                                       

Account#: [id number]                                                                            

Bed 11                                                                                            

Private MD:                                                                                       

Diagnosis: Pain in left shoulder                                                                  

                                                                                                  

Presentation:                                                                                     

                                                                                             

12:38 Chief complaint: Patient states: L shoulder pain radiating to upper back x 2 days,      ph  

      started after throwing football w/ her son, pt is 20 weeks pregnant. Coronavirus            

      screen: Vaccine status: Patient reports being unvaccinated. Ebola Screen: No symptoms       

      or risks identified at this time. Initial Sepsis Screen: Does the patient meet any 2        

      criteria? No. Patient's initial sepsis screen is negative. Does the patient have a          

      suspected source of infection? No. Patient's initial sepsis screen is negative. Risk        

      Assessment: Do you want to hurt yourself or someone else? Patient reports no desire to      

      harm self or others. Onset of symptoms was 2023.                                   

12:38 Method Of Arrival: Ambulatory                                                           ph  

12:38 Acuity: NEO 4                                                                           ph  

                                                                                                  

Historical:                                                                                       

- Allergies:                                                                                      

12:40 No Known Allergies;                                                                     ph  

- PMHx:                                                                                           

12:40 None;                                                                                   ph  

                                                                                                  

- Immunization history:: Adult Immunizations up to date.                                          

- Social history:: Smoking status: Patient denies any tobacco usage or history of.                

- Family history:: not pertinent.                                                                 

                                                                                                  

                                                                                                  

Screenin:46 [facility] ED Fall Risk Assessment (Adult) History of falling in the last 3 months,       ss  

      including since admission No falls in past 3 months (0 pts). Abuse screen: Denies           

      threats or abuse. Denies injuries from another. Nutritional screening: No deficits          

      noted. Tuberculosis screening: Never had TB.                                                

                                                                                                  

Assessment:                                                                                       

13:46 General: Appears in no apparent distress. comfortable, Behavior is calm, cooperative.   ss  

      Pain: Complains of pain in shoulder, L Pain currently is 10 out of 10 on a pain scale.      

      Neuro: Level of Consciousness is awake, alert, obeys commands. Respiratory: Airway is       

      patent Respiratory effort is even, unlabored, Respiratory pattern is regular,               

      symmetrical. Derm: Skin is pink, warm \T\ dry. normal.                                      

                                                                                                  

Vital Signs:                                                                                      

12:38 BP 98 / 68; Pulse 92; Resp 18; Temp 98.2; Pulse Ox 98% on R/A; Weight 64.86 kg; Height  ph  

      5 ft. 2 in. ; Pain 10/10;                                                                   

12:38 Body Mass Index 26.15 (64.86 kg, 157.48 cm)                                             ph  

12:38 Pain Scale: Adult                                                                       ph  

                                                                                                  

ED Course:                                                                                        

12:07 Patient arrived in ED.                                                                  mr  

12:07 Jared Encinas MD is Attending Physician.                                            rt  

12:40 Triage completed.                                                                       ph  

12:40 Arm band placed on Patient placed in an exam room.                                      ph  

13:14 Shoulder Left (2 View) XRAY In Process Unspecified.                                     EDMS

13:46 Lottie Reddy, RN is Primary Nurse.                                                 ss  

13:46 Patient has correct armband on for positive identification.                             ss  

13:46 No provider procedures requiring assistance completed. Patient did not have IV access   ss  

      during this emergency room visit.                                                           

                                                                                                  

Administered Medications:                                                                         

No medications were administered                                                                  

                                                                                                  

                                                                                                  

Medication:                                                                                       

13:46 VIS not applicable for this client.                                                     ss  

                                                                                                  

Outcome:                                                                                          

13:41 Discharge ordered by MD.                                                                rt  

13:50 Discharged to home ambulatory.                                                          ss  

13:50 Condition: good                                                                             

13:50 Discharge instructions given to patient, Instructed on discharge instructions, follow       

      up and referral plans. Demonstrated understanding of instructions, follow-up care.          

13:51 Patient left the ED.                                                                    ss  

                                                                                                  

Signatures:                                                                                       

Dispatcher [employer]                           EDMS                                                 

Josie Mcwilliams                                 mr                                                   

Lottie Reddy, JOSE                   RN                                                      

Crystal Carrera RN                      RN                                                      

Jared Encinas MD MD   rt                                                   

                                                                                                  

**************************************************************************************************